# Patient Record
Sex: FEMALE | Race: WHITE | NOT HISPANIC OR LATINO | URBAN - METROPOLITAN AREA
[De-identification: names, ages, dates, MRNs, and addresses within clinical notes are randomized per-mention and may not be internally consistent; named-entity substitution may affect disease eponyms.]

---

## 2017-03-16 ENCOUNTER — INPATIENT (INPATIENT)
Facility: HOSPITAL | Age: 82
LOS: 2 days | Discharge: HOME | DRG: 311 | End: 2017-03-19
Attending: INTERNAL MEDICINE | Admitting: INTERNAL MEDICINE
Payer: MEDICARE

## 2017-03-16 VITALS
HEART RATE: 96 BPM | WEIGHT: 134.92 LBS | SYSTOLIC BLOOD PRESSURE: 165 MMHG | HEIGHT: 62 IN | RESPIRATION RATE: 17 BRPM | DIASTOLIC BLOOD PRESSURE: 78 MMHG | TEMPERATURE: 98 F

## 2017-03-16 DIAGNOSIS — I24.9 ACUTE ISCHEMIC HEART DISEASE, UNSPECIFIED: ICD-10-CM

## 2017-03-16 DIAGNOSIS — E03.9 HYPOTHYROIDISM, UNSPECIFIED: ICD-10-CM

## 2017-03-16 DIAGNOSIS — Z41.8 ENCOUNTER FOR OTHER PROCEDURES FOR PURPOSES OTHER THAN REMEDYING HEALTH STATE: ICD-10-CM

## 2017-03-16 DIAGNOSIS — M54.9 DORSALGIA, UNSPECIFIED: ICD-10-CM

## 2017-03-16 DIAGNOSIS — Z90.49 ACQUIRED ABSENCE OF OTHER SPECIFIED PARTS OF DIGESTIVE TRACT: Chronic | ICD-10-CM

## 2017-03-16 DIAGNOSIS — I10 ESSENTIAL (PRIMARY) HYPERTENSION: ICD-10-CM

## 2017-03-16 DIAGNOSIS — Z90.710 ACQUIRED ABSENCE OF BOTH CERVIX AND UTERUS: Chronic | ICD-10-CM

## 2017-03-16 LAB
ACETONE SERPL-MCNC: NEGATIVE — SIGNIFICANT CHANGE UP
ALBUMIN SERPL ELPH-MCNC: 3.5 G/DL — SIGNIFICANT CHANGE UP (ref 3.5–5)
ALP SERPL-CCNC: 83 U/L — SIGNIFICANT CHANGE UP (ref 40–120)
ALT FLD-CCNC: 14 U/L DA — SIGNIFICANT CHANGE UP (ref 10–60)
ANION GAP SERPL CALC-SCNC: 10 MMOL/L — SIGNIFICANT CHANGE UP (ref 5–17)
APTT BLD: 32.6 SEC — SIGNIFICANT CHANGE UP (ref 27.5–37.4)
AST SERPL-CCNC: 11 U/L — SIGNIFICANT CHANGE UP (ref 10–40)
BASOPHILS # BLD AUTO: 0.1 K/UL — SIGNIFICANT CHANGE UP (ref 0–0.2)
BASOPHILS NFR BLD AUTO: 0.9 % — SIGNIFICANT CHANGE UP (ref 0–2)
BILIRUB SERPL-MCNC: 0.2 MG/DL — SIGNIFICANT CHANGE UP (ref 0.2–1.2)
BUN SERPL-MCNC: 11 MG/DL — SIGNIFICANT CHANGE UP (ref 7–18)
CALCIUM SERPL-MCNC: 9.1 MG/DL — SIGNIFICANT CHANGE UP (ref 8.4–10.5)
CHLORIDE SERPL-SCNC: 107 MMOL/L — SIGNIFICANT CHANGE UP (ref 96–108)
CK MB BLD-MCNC: 1.9 % — SIGNIFICANT CHANGE UP (ref 0–3.5)
CK MB BLD-MCNC: 2.5 % — SIGNIFICANT CHANGE UP (ref 0–3.5)
CK MB CFR SERPL CALC: 1.1 NG/ML — SIGNIFICANT CHANGE UP (ref 0–3.6)
CK MB CFR SERPL CALC: 1.3 NG/ML — SIGNIFICANT CHANGE UP (ref 0–3.6)
CK SERPL-CCNC: 51 U/L — SIGNIFICANT CHANGE UP (ref 21–215)
CK SERPL-CCNC: 58 U/L — SIGNIFICANT CHANGE UP (ref 21–215)
CO2 SERPL-SCNC: 25 MMOL/L — SIGNIFICANT CHANGE UP (ref 22–31)
CREAT SERPL-MCNC: 0.86 MG/DL — SIGNIFICANT CHANGE UP (ref 0.5–1.3)
EOSINOPHIL # BLD AUTO: 0.1 K/UL — SIGNIFICANT CHANGE UP (ref 0–0.5)
EOSINOPHIL NFR BLD AUTO: 1.2 % — SIGNIFICANT CHANGE UP (ref 0–6)
GLUCOSE SERPL-MCNC: 103 MG/DL — HIGH (ref 70–99)
HCT VFR BLD CALC: 40.3 % — SIGNIFICANT CHANGE UP (ref 34.5–45)
HGB BLD-MCNC: 13.4 G/DL — SIGNIFICANT CHANGE UP (ref 11.5–15.5)
INR BLD: 0.91 RATIO — SIGNIFICANT CHANGE UP (ref 0.88–1.16)
LACTATE SERPL-SCNC: 1.5 MMOL/L — SIGNIFICANT CHANGE UP (ref 0.7–2)
LIDOCAIN IGE QN: 106 U/L — SIGNIFICANT CHANGE UP (ref 73–393)
LYMPHOCYTES # BLD AUTO: 2.1 K/UL — SIGNIFICANT CHANGE UP (ref 1–3.3)
LYMPHOCYTES # BLD AUTO: 26.9 % — SIGNIFICANT CHANGE UP (ref 13–44)
MAGNESIUM SERPL-MCNC: 2 MG/DL — SIGNIFICANT CHANGE UP (ref 1.8–2.4)
MCHC RBC-ENTMCNC: 29.2 PG — SIGNIFICANT CHANGE UP (ref 27–34)
MCHC RBC-ENTMCNC: 33.2 GM/DL — SIGNIFICANT CHANGE UP (ref 32–36)
MCV RBC AUTO: 88 FL — SIGNIFICANT CHANGE UP (ref 80–100)
MONOCYTES # BLD AUTO: 0.6 K/UL — SIGNIFICANT CHANGE UP (ref 0–0.9)
MONOCYTES NFR BLD AUTO: 7.3 % — SIGNIFICANT CHANGE UP (ref 2–14)
NEUTROPHILS # BLD AUTO: 4.9 K/UL — SIGNIFICANT CHANGE UP (ref 1.8–7.4)
NEUTROPHILS NFR BLD AUTO: 63.6 % — SIGNIFICANT CHANGE UP (ref 43–77)
NT-PROBNP SERPL-SCNC: 523 PG/ML — HIGH (ref 0–450)
PLATELET # BLD AUTO: 187 K/UL — SIGNIFICANT CHANGE UP (ref 150–400)
POTASSIUM SERPL-MCNC: 3.5 MMOL/L — SIGNIFICANT CHANGE UP (ref 3.5–5.3)
POTASSIUM SERPL-SCNC: 3.5 MMOL/L — SIGNIFICANT CHANGE UP (ref 3.5–5.3)
PROT SERPL-MCNC: 7.4 G/DL — SIGNIFICANT CHANGE UP (ref 6–8.3)
PROTHROM AB SERPL-ACNC: 10.2 SEC — SIGNIFICANT CHANGE UP (ref 10–13.1)
RBC # BLD: 4.58 M/UL — SIGNIFICANT CHANGE UP (ref 3.8–5.2)
RBC # FLD: 12.9 % — SIGNIFICANT CHANGE UP (ref 10.3–14.5)
SODIUM SERPL-SCNC: 142 MMOL/L — SIGNIFICANT CHANGE UP (ref 135–145)
T4 AB SER-ACNC: 10.7 UG/DL — SIGNIFICANT CHANGE UP (ref 4.6–12)
TROPONIN I SERPL-MCNC: <0.015 NG/ML — SIGNIFICANT CHANGE UP (ref 0–0.04)
TROPONIN I SERPL-MCNC: <0.015 NG/ML — SIGNIFICANT CHANGE UP (ref 0–0.04)
TSH SERPL-MCNC: 0.26 UU/ML — LOW (ref 0.34–4.82)
TSH SERPL-MCNC: 0.27 UU/ML — LOW (ref 0.34–4.82)
WBC # BLD: 7.6 K/UL — SIGNIFICANT CHANGE UP (ref 3.8–10.5)
WBC # FLD AUTO: 7.6 K/UL — SIGNIFICANT CHANGE UP (ref 3.8–10.5)

## 2017-03-16 PROCEDURE — 99285 EMERGENCY DEPT VISIT HI MDM: CPT

## 2017-03-16 PROCEDURE — 71010: CPT | Mod: 26

## 2017-03-16 RX ORDER — ASPIRIN/CALCIUM CARB/MAGNESIUM 324 MG
162 TABLET ORAL ONCE
Qty: 0 | Refills: 0 | Status: COMPLETED | OUTPATIENT
Start: 2017-03-16 | End: 2017-03-16

## 2017-03-16 RX ORDER — ATORVASTATIN CALCIUM 80 MG/1
20 TABLET, FILM COATED ORAL AT BEDTIME
Qty: 0 | Refills: 0 | Status: DISCONTINUED | OUTPATIENT
Start: 2017-03-16 | End: 2017-03-19

## 2017-03-16 RX ORDER — SODIUM CHLORIDE 9 MG/ML
3 INJECTION INTRAMUSCULAR; INTRAVENOUS; SUBCUTANEOUS ONCE
Qty: 0 | Refills: 0 | Status: COMPLETED | OUTPATIENT
Start: 2017-03-16 | End: 2017-03-16

## 2017-03-16 RX ORDER — ENOXAPARIN SODIUM 100 MG/ML
40 INJECTION SUBCUTANEOUS DAILY
Qty: 0 | Refills: 0 | Status: DISCONTINUED | OUTPATIENT
Start: 2017-03-16 | End: 2017-03-19

## 2017-03-16 RX ORDER — ASPIRIN/CALCIUM CARB/MAGNESIUM 324 MG
81 TABLET ORAL DAILY
Qty: 0 | Refills: 0 | Status: DISCONTINUED | OUTPATIENT
Start: 2017-03-16 | End: 2017-03-19

## 2017-03-16 RX ORDER — METOPROLOL TARTRATE 50 MG
50 TABLET ORAL DAILY
Qty: 0 | Refills: 0 | Status: DISCONTINUED | OUTPATIENT
Start: 2017-03-16 | End: 2017-03-19

## 2017-03-16 RX ORDER — AMLODIPINE BESYLATE 2.5 MG/1
5 TABLET ORAL DAILY
Qty: 0 | Refills: 0 | Status: DISCONTINUED | OUTPATIENT
Start: 2017-03-16 | End: 2017-03-19

## 2017-03-16 RX ORDER — LOSARTAN POTASSIUM 100 MG/1
50 TABLET, FILM COATED ORAL DAILY
Qty: 0 | Refills: 0 | Status: DISCONTINUED | OUTPATIENT
Start: 2017-03-16 | End: 2017-03-19

## 2017-03-16 RX ORDER — LEVOTHYROXINE SODIUM 125 MCG
88 TABLET ORAL DAILY
Qty: 0 | Refills: 0 | Status: DISCONTINUED | OUTPATIENT
Start: 2017-03-16 | End: 2017-03-16

## 2017-03-16 RX ADMIN — Medication 1 MILLIGRAM(S): at 23:21

## 2017-03-16 RX ADMIN — ATORVASTATIN CALCIUM 20 MILLIGRAM(S): 80 TABLET, FILM COATED ORAL at 23:19

## 2017-03-16 RX ADMIN — Medication 162 MILLIGRAM(S): at 17:07

## 2017-03-16 RX ADMIN — SODIUM CHLORIDE 3 MILLILITER(S): 9 INJECTION INTRAMUSCULAR; INTRAVENOUS; SUBCUTANEOUS at 17:07

## 2017-03-16 NOTE — ED ADULT NURSE NOTE - OBJECTIVE STATEMENT
AOX3 +ambulatory patient reports chest pain and sob x last night. Patient denies any numbness or tingling sensation denies any fevers or coughing

## 2017-03-16 NOTE — H&P ADULT. - PROBLEM SELECTOR PLAN 4
-IMPROVE VTE score 2, will start prophylaxis with Lovenox 40mg daily subcu. -Patient takes percocet 5/325mg q 12hrs for pain, will continue at the same dose.

## 2017-03-16 NOTE — H&P ADULT. - PROBLEM SELECTOR PLAN 1
-EKG NSR, troponin x1 negative, trend cardiac enzymes.  -Dr. Villarreal was consulted.  -Check TTE and monitor telemetry -EKG NSR, troponin x1 negative, trend cardiac enzymes.  -Dr. Villarreal was consulted.  -Check TTE and monitor telemetry. Stress test was not ordered at this time as per Dr. Villarreal. -EKG NSR, troponin x1 negative, trend cardiac enzymes.  -Dr. Villarreal was consulted.  -Check TTE and monitor telemetry. Stress test was not ordered at this time as per Dr. Villarreal.  -ACS protocol with metoprolol 50mg XL at home dose, aspirin, and lipitor 20mg(she takes simvastatin 20mg at home).

## 2017-03-16 NOTE — H&P ADULT. - ASSESSMENT
86 year-old female from home with private HHA 24/7, walks with cane and has PMH of Crohn's disease, hypothyroidism, HTN, HLD, Meningioma (not maligniant), herniated discs and PSHx of partial Hysterectomy and appendectomy presented to ED with c/o feeling of not being well, one episode of left sided stabbing chest pain, 3/10 in intensity, radiating to left  arm and back, worsens with deep breathing, which lasted for few seconds. She also had whole body shakiness with sweating however did not take temperature at home. Has had profuse sweating noted on the bed and decreased appetite for past few days. As per patient, when EMS arrived at home, her SBP was in 200's. In ED, BP was 167/58 other vitals were stable. Patient was symptom free when seen by me in ED. Was admitted to telemetry floor for concern for ACS. EKG NSR at 98BPM with left axis deviation. 86 year-old female from home with private HHA 24/7, walks with cane and has PMH of Crohn's disease, hypothyroidism, HTN, HLD, Meningioma (not maligniant), herniated discs and PSH of partial Hysterectomy and appendectomy presented to ED with c/o feeling of not being well, one episode of left sided stabbing chest pain happened this morning, 3/10 in intensity, radiating to left  arm and back, worsens with deep breathing, which lasted for few seconds. She also had whole body shakiness with sweating however did not take temperature at home. Has had profuse sweating noted on the bed and decreased appetite for past few days. She has been followed by outpatient cardiologist Dr. Whitfield, however denied recent cardiac work up such as echocardiogram, stress test, and cardiac cath. As per patient, when EMS arrived at home, her SBP was in 200's. In ED, BP was 167/58 other vitals were stable. Patient was symptom free when seen by me in ED. Was admitted to telemetry floor for concern for ACS. EKG NSR at 98BPM with left axis deviation.

## 2017-03-16 NOTE — H&P ADULT. - PROBLEM SELECTOR PLAN 2
-Patient states that she had SBP of 200's at home, symptoms can be from hypertensive emergency.  -Will continue home meds at the same dose except hydralazine and felodipine, since patient does not know hydralazine dose now and felodipine is not available in our pharmacy. Will switch it to Norvasc and monitor BP.   -Primary team follow up with outpatient cardiologist regarding correct medication dose. -Patient states that she had SBP of 200's at home, symptoms can be from hypertensive emergency.  -Will continue home meds at the same dose except hydralazine and felodipine, since patient does not know hydralazine dose now and felodipine is not available in our pharmacy. Will switch it to Norvasc and monitor BP.   -Primary team follow up with outpatient cardiologist/pharmacy regarding correct medication dose, since patient gives inconsistent history and is somewhat confused about medication dose. I reconciled meds based on the list that she is carrying.

## 2017-03-16 NOTE — ED PROVIDER NOTE - MEDICAL DECISION MAKING DETAILS
Pt with rigors, diaphoresis and pale. Concern for ACS vs infectious process, will get labs, EKG then reassess.

## 2017-03-16 NOTE — ED PROVIDER NOTE - OBJECTIVE STATEMENT
87 y/o female BIBA with PMHx of Chrons Disease, HTN, HLD, Meningioma (not maligniant), herniated discs and PSHx of Partial Hysterectomy and Appendectomy presents to the ED c/o rigors, diaphoretic and pale this morning. Pt reports that she has been noticing that for the last 2 weeks she has been having increased sweatiness and pain to L breast and also and L arm pain. Pt also with decreased appetite since yesterday. Pt denies fever, coughing, pain with urination, or any other complaints. NKDA. PMD: Dr. Shane. Pt is a former smoker for 40 years, stopped 20 years ago. 87 y/o female BIBA with PMHx of Crohn's Disease, HTN, HLD, Meningioma (not maligniant), herniated discs and PSHx of Partial Hysterectomy and Appendectomy presents to the ED c/o rigors, diaphoretic and pale this morning. Pt reports that she has been noticing that for the last 2 weeks she has been having increased sweatiness and pain to L breast and also and L arm pain. Pt also with decreased appetite since yesterday. Pt denies fever, coughing, pain with urination, or any other complaints. NKDA. PMD: Dr. Shane. Pt is a former smoker for 40 years, stopped 20 years ago.

## 2017-03-16 NOTE — ED PROVIDER NOTE - NS ED MD SCRIBE ATTENDING SCRIBE SECTIONS
REVIEW OF SYSTEMS/PHYSICAL EXAM/VITAL SIGNS( Pullset)/HISTORY OF PRESENT ILLNESS/HIV/DISPOSITION/PAST MEDICAL/SURGICAL/SOCIAL HISTORY

## 2017-03-16 NOTE — H&P ADULT. - PROBLEM SELECTOR PLAN 3
-Patient has h/o hypothyroidism and takes 88mcg of synthroid, on admission TSH was 0.27 with normal free T4.  -Will check free and total T3 and recheck TSH, hold synthroid for now since patient's symptoms raise concern for hyperthyroidism(tremor, sweating, and anxiety). Resume as appropriate. -Patient has h/o hypothyroidism and takes 88mcg of synthroid, on admission TSH was 0.27 with normal free T4.  -Will check free and total T3 and recheck TSH, hold synthroid for now since patient's symptoms raise concern for hyperthyroidism(tremor, sweating, and anxiety). Resume synthroid as appropriate.

## 2017-03-17 LAB
ANION GAP SERPL CALC-SCNC: 10 MMOL/L — SIGNIFICANT CHANGE UP (ref 5–17)
BUN SERPL-MCNC: 14 MG/DL — SIGNIFICANT CHANGE UP (ref 7–18)
CALCIUM SERPL-MCNC: 9.4 MG/DL — SIGNIFICANT CHANGE UP (ref 8.4–10.5)
CHLORIDE SERPL-SCNC: 108 MMOL/L — SIGNIFICANT CHANGE UP (ref 96–108)
CK MB BLD-MCNC: 3 % — SIGNIFICANT CHANGE UP (ref 0–3.5)
CK MB CFR SERPL CALC: 1.4 NG/ML — SIGNIFICANT CHANGE UP (ref 0–3.6)
CK SERPL-CCNC: 47 U/L — SIGNIFICANT CHANGE UP (ref 21–215)
CO2 SERPL-SCNC: 26 MMOL/L — SIGNIFICANT CHANGE UP (ref 22–31)
CREAT SERPL-MCNC: 0.9 MG/DL — SIGNIFICANT CHANGE UP (ref 0.5–1.3)
GLUCOSE SERPL-MCNC: 95 MG/DL — SIGNIFICANT CHANGE UP (ref 70–99)
HCT VFR BLD CALC: 38.4 % — SIGNIFICANT CHANGE UP (ref 34.5–45)
HGB BLD-MCNC: 12.9 G/DL — SIGNIFICANT CHANGE UP (ref 11.5–15.5)
MAGNESIUM SERPL-MCNC: 2.2 MG/DL — SIGNIFICANT CHANGE UP (ref 1.8–2.4)
MCHC RBC-ENTMCNC: 29 PG — SIGNIFICANT CHANGE UP (ref 27–34)
MCHC RBC-ENTMCNC: 33.5 GM/DL — SIGNIFICANT CHANGE UP (ref 32–36)
MCV RBC AUTO: 86.6 FL — SIGNIFICANT CHANGE UP (ref 80–100)
PHOSPHATE SERPL-MCNC: 4.2 MG/DL — SIGNIFICANT CHANGE UP (ref 2.5–4.5)
PLATELET # BLD AUTO: 181 K/UL — SIGNIFICANT CHANGE UP (ref 150–400)
POTASSIUM SERPL-MCNC: 3.6 MMOL/L — SIGNIFICANT CHANGE UP (ref 3.5–5.3)
POTASSIUM SERPL-SCNC: 3.6 MMOL/L — SIGNIFICANT CHANGE UP (ref 3.5–5.3)
RBC # BLD: 4.43 M/UL — SIGNIFICANT CHANGE UP (ref 3.8–5.2)
RBC # FLD: 13.1 % — SIGNIFICANT CHANGE UP (ref 10.3–14.5)
SODIUM SERPL-SCNC: 144 MMOL/L — SIGNIFICANT CHANGE UP (ref 135–145)
T3 SERPL-MCNC: 105 NG/DL — SIGNIFICANT CHANGE UP (ref 80–200)
T3FREE SERPL-MCNC: 2.77 PG/ML — SIGNIFICANT CHANGE UP (ref 1.8–4.6)
TROPONIN I SERPL-MCNC: <0.015 NG/ML — SIGNIFICANT CHANGE UP (ref 0–0.04)
WBC # BLD: 6.5 K/UL — SIGNIFICANT CHANGE UP (ref 3.8–10.5)
WBC # FLD AUTO: 6.5 K/UL — SIGNIFICANT CHANGE UP (ref 3.8–10.5)

## 2017-03-17 RX ORDER — POLYETHYLENE GLYCOL 3350 17 G/17G
17 POWDER, FOR SOLUTION ORAL ONCE
Qty: 0 | Refills: 0 | Status: COMPLETED | OUTPATIENT
Start: 2017-03-17 | End: 2017-03-17

## 2017-03-17 RX ORDER — LEVOTHYROXINE SODIUM 125 MCG
1 TABLET ORAL
Qty: 0 | Refills: 0 | COMMUNITY

## 2017-03-17 RX ORDER — LEVOTHYROXINE SODIUM 125 MCG
1 TABLET ORAL
Qty: 0 | Refills: 0 | DISCHARGE
Start: 2017-03-17

## 2017-03-17 RX ORDER — AMLODIPINE BESYLATE 2.5 MG/1
5 TABLET ORAL ONCE
Qty: 0 | Refills: 0 | Status: COMPLETED | OUTPATIENT
Start: 2017-03-17 | End: 2017-03-17

## 2017-03-17 RX ORDER — LEVOTHYROXINE SODIUM 125 MCG
75 TABLET ORAL DAILY
Qty: 0 | Refills: 0 | Status: DISCONTINUED | OUTPATIENT
Start: 2017-03-17 | End: 2017-03-19

## 2017-03-17 RX ORDER — LANOLIN ALCOHOL/MO/W.PET/CERES
3 CREAM (GRAM) TOPICAL AT BEDTIME
Qty: 0 | Refills: 0 | Status: DISCONTINUED | OUTPATIENT
Start: 2017-03-17 | End: 2017-03-19

## 2017-03-17 RX ADMIN — Medication 1 MILLIGRAM(S): at 20:12

## 2017-03-17 RX ADMIN — AMLODIPINE BESYLATE 5 MILLIGRAM(S): 2.5 TABLET ORAL at 06:27

## 2017-03-17 RX ADMIN — Medication 50 MILLIGRAM(S): at 06:27

## 2017-03-17 RX ADMIN — Medication 3 MILLIGRAM(S): at 01:17

## 2017-03-17 RX ADMIN — Medication 81 MILLIGRAM(S): at 12:32

## 2017-03-17 RX ADMIN — AMLODIPINE BESYLATE 5 MILLIGRAM(S): 2.5 TABLET ORAL at 17:27

## 2017-03-17 RX ADMIN — LOSARTAN POTASSIUM 50 MILLIGRAM(S): 100 TABLET, FILM COATED ORAL at 06:27

## 2017-03-17 RX ADMIN — POLYETHYLENE GLYCOL 3350 17 GRAM(S): 17 POWDER, FOR SOLUTION ORAL at 14:50

## 2017-03-17 RX ADMIN — Medication 3 MILLIGRAM(S): at 23:38

## 2017-03-17 RX ADMIN — ATORVASTATIN CALCIUM 20 MILLIGRAM(S): 80 TABLET, FILM COATED ORAL at 21:49

## 2017-03-17 NOTE — DISCHARGE NOTE ADULT - PATIENT PORTAL LINK FT
“You can access the [facility] Patient Portal, offered by [facility], by registering with the following website: [email or web address]”

## 2017-03-17 NOTE — DISCHARGE NOTE ADULT - CARE PLAN
Principal Discharge DX:	ACS (acute coronary syndrome)  Goal:	ACS ruled out  Instructions for follow-up, activity and diet:	Follow with primary care doctor within a week of discharge. Seek urgent medical attention if you have severe chest pain and follow up with PCP within a week of discharge.  Secondary Diagnosis:	Essential hypertension  Instructions for follow-up, activity and diet:	Continue medical management as prescribed.  Secondary Diagnosis:	Hypothyroidism  Instructions for follow-up, activity and diet:	Continue levothyroxine 75 mcg.  Secondary Diagnosis:	Back pain  Instructions for follow-up, activity and diet:	Continue pain medication as pre PCP. Principal Discharge DX:	ACS (acute coronary syndrome)  Goal:	ACS ruled out  Instructions for follow-up, activity and diet:	Follow with primary care doctor within a week of discharge. Seek urgent medical attention if you have severe chest pain and follow up with PCP within a week of discharge.  Secondary Diagnosis:	Essential hypertension  Instructions for follow-up, activity and diet:	Continue medical management as prescribed. Goal BP <150/90  Secondary Diagnosis:	Hypothyroidism  Instructions for follow-up, activity and diet:	Continue levothyroxine 75 mcg.  Secondary Diagnosis:	Back pain  Instructions for follow-up, activity and diet:	Continue pain medication as pre PCP.

## 2017-03-17 NOTE — DISCHARGE NOTE ADULT - PLAN OF CARE
ACS ruled out Follow with primary care doctor within a week of discharge. Seek urgent medical attention if you have severe chest pain and follow up with PCP within a week of discharge. Continue medical management as prescribed. Continue levothyroxine 75 mcg. Continue pain medication as pre PCP. Continue medical management as prescribed. Goal BP <150/90

## 2017-03-17 NOTE — DISCHARGE NOTE ADULT - HOSPITAL COURSE
86 year-old female from home with private HHA 24/7, walks with cane and has PMH of Crohn's disease, hypothyroidism, HTN, HLD, Meningioma (not maligniant), herniated discs and PSH of partial Hysterectomy and appendectomy presented to ED with c/o feeling of not being well, one episode of left sided stabbing chest pain happened this morning, 3/10 in intensity, radiating to left  arm and back, worsens with deep breathing, which lasted for few seconds. She also had whole body shakiness with sweating however did not take temperature at home. Has had profuse sweating noted on the bed and decreased appetite for past few days. She has been followed by outpatient cardiologist Dr. Whitfield, however denied recent cardiac work up such as echocardiogram, stress test, and cardiac cath. As per patient, when EMS arrived at home, her SBP was in 200's. In ED, BP was 167/58 other vitals were stable. Patient was symptom free when seen by me in ED. Was admitted to telemetry floor for concern for ACS. EKG NSR at 98BPM with left axis deviation.     Patient was admitted for ACS (acute coronary syndrome). EKG NSR, troponin x1 negative, cardiac enzymes remained -ve. Dr. Villarreal was consulted. Echo showed Normal Left Ventricular Systolic Function,  (EF = 55 to 60%Check TTE and monitor telemetry. Stress test was not ordered at this time as per Dr. Villarreal. Patient was treated with metoprolol 50mg XL at home dose, aspirin, and lipitor 20mg(she takes simvastatin 20mg at home).        Essential hypertension.    -Patient states that she had SBP of 200's at home, symptoms can be from hypertensive emergency. Will continue home meds at the same dose except hydralazine and felodipine, since patient does not know hydralazine dose now and felodipine is not available in our pharmacy. We switched it to Norvasc and monitor BP.     Hypothyroidism. Patient has h/o hypothyroidism and takes 88mcg of synthroid, on admission TSH was 0.27 with normal free T4. Since patient's symptoms raise concern for hyperthyroidism (tremor, sweating, and anxiety). levothyroxine dose decreased to 75 daily      Back pain. Patient takes percocet 5/325mg q 12hrs for pain, we continued at the same dose.    Patient is stable to be discharged home as per attendings.

## 2017-03-17 NOTE — DISCHARGE NOTE ADULT - MEDICATION SUMMARY - MEDICATIONS TO TAKE
I will START or STAY ON the medications listed below when I get home from the hospital:    aspirin  --  by mouth   -- Indication: For ASCVD prevention    Percocet 5/325  --  by mouth   -- Indication: For Pain as needed    Percocet 5/325 oral tablet  -- 1 tab(s) by mouth every 12 hours, As Needed  -- Indication: For Pain as needed    Ativan 1 mg oral tablet  -- 1 milligram(s) by mouth once a day (at bedtime), As Needed  -- Indication: For Anxiety    simvastatin 20 mg oral tablet  -- 1 tab(s) by mouth once a day (at bedtime)  -- Indication: For HLD    Hyzaar 50 mg-12.5 mg oral tablet  -- 1 tab(s) by mouth once a day  -- Indication: For Htn    Toprol-XL 50 mg oral tablet, extended release  -- 1 tab(s) by mouth once a day  -- Indication: For Htn    Plendil  -- 5 milligram(s) by mouth 2 times a day  -- Indication: For Htn    levothyroxine 75 mcg (0.075 mg) oral tablet  -- 1 tab(s) by mouth once a day  -- Indication: For Hypothyroidism    hydrALAZINE  --  by mouth once a day  -- Indication: For Htn

## 2017-03-18 RX ADMIN — ENOXAPARIN SODIUM 40 MILLIGRAM(S): 100 INJECTION SUBCUTANEOUS at 11:31

## 2017-03-18 RX ADMIN — Medication 1 MILLIGRAM(S): at 20:32

## 2017-03-18 RX ADMIN — Medication 75 MICROGRAM(S): at 06:44

## 2017-03-18 RX ADMIN — Medication 81 MILLIGRAM(S): at 11:31

## 2017-03-18 RX ADMIN — LOSARTAN POTASSIUM 50 MILLIGRAM(S): 100 TABLET, FILM COATED ORAL at 06:45

## 2017-03-18 RX ADMIN — AMLODIPINE BESYLATE 5 MILLIGRAM(S): 2.5 TABLET ORAL at 06:44

## 2017-03-18 RX ADMIN — ATORVASTATIN CALCIUM 20 MILLIGRAM(S): 80 TABLET, FILM COATED ORAL at 21:41

## 2017-03-18 RX ADMIN — Medication 50 MILLIGRAM(S): at 06:44

## 2017-03-19 VITALS
OXYGEN SATURATION: 95 % | DIASTOLIC BLOOD PRESSURE: 28 MMHG | HEART RATE: 52 BPM | RESPIRATION RATE: 16 BRPM | TEMPERATURE: 98 F | SYSTOLIC BLOOD PRESSURE: 123 MMHG

## 2017-03-19 PROCEDURE — 87040 BLOOD CULTURE FOR BACTERIA: CPT

## 2017-03-19 PROCEDURE — 84443 ASSAY THYROID STIM HORMONE: CPT

## 2017-03-19 PROCEDURE — 83880 ASSAY OF NATRIURETIC PEPTIDE: CPT

## 2017-03-19 PROCEDURE — 80048 BASIC METABOLIC PNL TOTAL CA: CPT

## 2017-03-19 PROCEDURE — 93005 ELECTROCARDIOGRAM TRACING: CPT

## 2017-03-19 PROCEDURE — 83690 ASSAY OF LIPASE: CPT

## 2017-03-19 PROCEDURE — 82009 KETONE BODYS QUAL: CPT

## 2017-03-19 PROCEDURE — 85730 THROMBOPLASTIN TIME PARTIAL: CPT

## 2017-03-19 PROCEDURE — 83605 ASSAY OF LACTIC ACID: CPT

## 2017-03-19 PROCEDURE — 80053 COMPREHEN METABOLIC PANEL: CPT

## 2017-03-19 PROCEDURE — 82553 CREATINE MB FRACTION: CPT

## 2017-03-19 PROCEDURE — 84480 ASSAY TRIIODOTHYRONINE (T3): CPT

## 2017-03-19 PROCEDURE — 84481 FREE ASSAY (FT-3): CPT

## 2017-03-19 PROCEDURE — 85027 COMPLETE CBC AUTOMATED: CPT

## 2017-03-19 PROCEDURE — 82550 ASSAY OF CK (CPK): CPT

## 2017-03-19 PROCEDURE — 93306 TTE W/DOPPLER COMPLETE: CPT

## 2017-03-19 PROCEDURE — 84484 ASSAY OF TROPONIN QUANT: CPT

## 2017-03-19 PROCEDURE — 84100 ASSAY OF PHOSPHORUS: CPT

## 2017-03-19 PROCEDURE — 71045 X-RAY EXAM CHEST 1 VIEW: CPT

## 2017-03-19 PROCEDURE — 99285 EMERGENCY DEPT VISIT HI MDM: CPT | Mod: 25

## 2017-03-19 PROCEDURE — 84436 ASSAY OF TOTAL THYROXINE: CPT

## 2017-03-19 PROCEDURE — 85610 PROTHROMBIN TIME: CPT

## 2017-03-19 PROCEDURE — 83735 ASSAY OF MAGNESIUM: CPT

## 2017-03-19 RX ADMIN — Medication 50 MILLIGRAM(S): at 05:47

## 2017-03-19 RX ADMIN — LOSARTAN POTASSIUM 50 MILLIGRAM(S): 100 TABLET, FILM COATED ORAL at 05:47

## 2017-03-19 RX ADMIN — Medication 81 MILLIGRAM(S): at 12:43

## 2017-03-19 RX ADMIN — AMLODIPINE BESYLATE 5 MILLIGRAM(S): 2.5 TABLET ORAL at 05:46

## 2017-03-19 RX ADMIN — Medication 3 MILLIGRAM(S): at 01:16

## 2017-03-19 RX ADMIN — Medication 75 MICROGRAM(S): at 05:46

## 2017-03-22 DIAGNOSIS — Z87.891 PERSONAL HISTORY OF NICOTINE DEPENDENCE: ICD-10-CM

## 2017-03-22 DIAGNOSIS — K50.90 CROHN'S DISEASE, UNSPECIFIED, WITHOUT COMPLICATIONS: ICD-10-CM

## 2017-03-22 DIAGNOSIS — I10 ESSENTIAL (PRIMARY) HYPERTENSION: ICD-10-CM

## 2017-03-22 DIAGNOSIS — E03.9 HYPOTHYROIDISM, UNSPECIFIED: ICD-10-CM

## 2017-03-22 DIAGNOSIS — I24.9 ACUTE ISCHEMIC HEART DISEASE, UNSPECIFIED: ICD-10-CM

## 2017-03-22 DIAGNOSIS — E78.5 HYPERLIPIDEMIA, UNSPECIFIED: ICD-10-CM

## 2017-03-22 DIAGNOSIS — M54.9 DORSALGIA, UNSPECIFIED: ICD-10-CM

## 2017-03-22 DIAGNOSIS — D32.0 BENIGN NEOPLASM OF CEREBRAL MENINGES: ICD-10-CM

## 2017-03-22 LAB
CULTURE RESULTS: SIGNIFICANT CHANGE UP
CULTURE RESULTS: SIGNIFICANT CHANGE UP
SPECIMEN SOURCE: SIGNIFICANT CHANGE UP
SPECIMEN SOURCE: SIGNIFICANT CHANGE UP

## 2023-06-19 ENCOUNTER — LABORATORY RESULT (OUTPATIENT)
Age: 88
End: 2023-06-19

## 2023-06-19 ENCOUNTER — APPOINTMENT (OUTPATIENT)
Dept: NEPHROLOGY | Facility: CLINIC | Age: 88
End: 2023-06-19
Payer: MEDICARE

## 2023-06-19 VITALS — WEIGHT: 121.47 LBS

## 2023-06-19 DIAGNOSIS — R19.8 OTHER SPECIFIED SYMPTOMS AND SIGNS INVOLVING THE DIGESTIVE SYSTEM AND ABDOMEN: ICD-10-CM

## 2023-06-19 PROCEDURE — 99205 OFFICE O/P NEW HI 60 MIN: CPT | Mod: 25

## 2023-06-19 PROCEDURE — 93000 ELECTROCARDIOGRAM COMPLETE: CPT

## 2023-06-19 PROCEDURE — 36415 COLL VENOUS BLD VENIPUNCTURE: CPT

## 2023-06-27 ENCOUNTER — INPATIENT (INPATIENT)
Facility: HOSPITAL | Age: 88
LOS: 1 days | Discharge: HOME HEALTH | DRG: 543 | End: 2023-06-29
Attending: STUDENT IN AN ORGANIZED HEALTH CARE EDUCATION/TRAINING PROGRAM | Admitting: STUDENT IN AN ORGANIZED HEALTH CARE EDUCATION/TRAINING PROGRAM
Payer: MEDICARE

## 2023-06-27 VITALS
OXYGEN SATURATION: 95 % | RESPIRATION RATE: 18 BRPM | DIASTOLIC BLOOD PRESSURE: 67 MMHG | TEMPERATURE: 98 F | HEART RATE: 74 BPM | WEIGHT: 119.93 LBS | SYSTOLIC BLOOD PRESSURE: 154 MMHG | HEIGHT: 61 IN

## 2023-06-27 DIAGNOSIS — Z87.898 PERSONAL HISTORY OF OTHER SPECIFIED CONDITIONS: ICD-10-CM

## 2023-06-27 DIAGNOSIS — D32.9 BENIGN NEOPLASM OF MENINGES, UNSPECIFIED: ICD-10-CM

## 2023-06-27 DIAGNOSIS — Z90.49 ACQUIRED ABSENCE OF OTHER SPECIFIED PARTS OF DIGESTIVE TRACT: Chronic | ICD-10-CM

## 2023-06-27 DIAGNOSIS — I10 ESSENTIAL (PRIMARY) HYPERTENSION: ICD-10-CM

## 2023-06-27 DIAGNOSIS — M54.9 DORSALGIA, UNSPECIFIED: ICD-10-CM

## 2023-06-27 DIAGNOSIS — Z29.9 ENCOUNTER FOR PROPHYLACTIC MEASURES, UNSPECIFIED: ICD-10-CM

## 2023-06-27 DIAGNOSIS — Z90.710 ACQUIRED ABSENCE OF BOTH CERVIX AND UTERUS: Chronic | ICD-10-CM

## 2023-06-27 DIAGNOSIS — D64.9 ANEMIA, UNSPECIFIED: ICD-10-CM

## 2023-06-27 DIAGNOSIS — E78.5 HYPERLIPIDEMIA, UNSPECIFIED: ICD-10-CM

## 2023-06-27 LAB
ANION GAP SERPL CALC-SCNC: 10 MMOL/L — SIGNIFICANT CHANGE UP (ref 5–17)
APPEARANCE UR: CLEAR — SIGNIFICANT CHANGE UP
BACTERIA # UR AUTO: PRESENT /HPF
BASOPHILS # BLD AUTO: 0.02 K/UL — SIGNIFICANT CHANGE UP (ref 0–0.2)
BASOPHILS NFR BLD AUTO: 0.5 % — SIGNIFICANT CHANGE UP (ref 0–2)
BILIRUB UR-MCNC: NEGATIVE — SIGNIFICANT CHANGE UP
BUN SERPL-MCNC: 25 MG/DL — HIGH (ref 7–23)
CALCIUM SERPL-MCNC: 9 MG/DL — SIGNIFICANT CHANGE UP (ref 8.4–10.5)
CHLORIDE SERPL-SCNC: 112 MMOL/L — HIGH (ref 96–108)
CO2 SERPL-SCNC: 22 MMOL/L — SIGNIFICANT CHANGE UP (ref 22–31)
COLOR SPEC: YELLOW — SIGNIFICANT CHANGE UP
CREAT SERPL-MCNC: 1.06 MG/DL — SIGNIFICANT CHANGE UP (ref 0.5–1.3)
DIFF PNL FLD: NEGATIVE — SIGNIFICANT CHANGE UP
EGFR: 49 ML/MIN/1.73M2 — LOW
EOSINOPHIL # BLD AUTO: 0.15 K/UL — SIGNIFICANT CHANGE UP (ref 0–0.5)
EOSINOPHIL NFR BLD AUTO: 3.4 % — SIGNIFICANT CHANGE UP (ref 0–6)
EPI CELLS # UR: SIGNIFICANT CHANGE UP /HPF (ref 0–5)
GLUCOSE SERPL-MCNC: 100 MG/DL — HIGH (ref 70–99)
GLUCOSE UR QL: NEGATIVE — SIGNIFICANT CHANGE UP
HCT VFR BLD CALC: 31.3 % — LOW (ref 34.5–45)
HGB BLD-MCNC: 9.7 G/DL — LOW (ref 11.5–15.5)
IMM GRANULOCYTES NFR BLD AUTO: 0.2 % — SIGNIFICANT CHANGE UP (ref 0–0.9)
KETONES UR-MCNC: NEGATIVE — SIGNIFICANT CHANGE UP
LEUKOCYTE ESTERASE UR-ACNC: ABNORMAL
LIDOCAIN IGE QN: 19 U/L — SIGNIFICANT CHANGE UP (ref 7–60)
LYMPHOCYTES # BLD AUTO: 1.72 K/UL — SIGNIFICANT CHANGE UP (ref 1–3.3)
LYMPHOCYTES # BLD AUTO: 38.7 % — SIGNIFICANT CHANGE UP (ref 13–44)
MCHC RBC-ENTMCNC: 27.3 PG — SIGNIFICANT CHANGE UP (ref 27–34)
MCHC RBC-ENTMCNC: 31 GM/DL — LOW (ref 32–36)
MCV RBC AUTO: 88.2 FL — SIGNIFICANT CHANGE UP (ref 80–100)
MONOCYTES # BLD AUTO: 0.43 K/UL — SIGNIFICANT CHANGE UP (ref 0–0.9)
MONOCYTES NFR BLD AUTO: 9.7 % — SIGNIFICANT CHANGE UP (ref 2–14)
NEUTROPHILS # BLD AUTO: 2.11 K/UL — SIGNIFICANT CHANGE UP (ref 1.8–7.4)
NEUTROPHILS NFR BLD AUTO: 47.5 % — SIGNIFICANT CHANGE UP (ref 43–77)
NITRITE UR-MCNC: NEGATIVE — SIGNIFICANT CHANGE UP
NRBC # BLD: 0 /100 WBCS — SIGNIFICANT CHANGE UP (ref 0–0)
PH UR: 5.5 — SIGNIFICANT CHANGE UP (ref 5–8)
PLATELET # BLD AUTO: 144 K/UL — LOW (ref 150–400)
POTASSIUM SERPL-MCNC: 4.4 MMOL/L — SIGNIFICANT CHANGE UP (ref 3.5–5.3)
POTASSIUM SERPL-SCNC: 4.4 MMOL/L — SIGNIFICANT CHANGE UP (ref 3.5–5.3)
PROT UR-MCNC: NEGATIVE MG/DL — SIGNIFICANT CHANGE UP
RBC # BLD: 3.55 M/UL — LOW (ref 3.8–5.2)
RBC # FLD: 15.7 % — HIGH (ref 10.3–14.5)
RBC CASTS # UR COMP ASSIST: < 5 /HPF — SIGNIFICANT CHANGE UP
SODIUM SERPL-SCNC: 144 MMOL/L — SIGNIFICANT CHANGE UP (ref 135–145)
SP GR SPEC: 1.02 — SIGNIFICANT CHANGE UP (ref 1–1.03)
UROBILINOGEN FLD QL: 0.2 E.U./DL — SIGNIFICANT CHANGE UP
WBC # BLD: 4.44 K/UL — SIGNIFICANT CHANGE UP (ref 3.8–10.5)
WBC # FLD AUTO: 4.44 K/UL — SIGNIFICANT CHANGE UP (ref 3.8–10.5)
WBC UR QL: < 5 /HPF — SIGNIFICANT CHANGE UP

## 2023-06-27 PROCEDURE — G1004: CPT

## 2023-06-27 PROCEDURE — 99223 1ST HOSP IP/OBS HIGH 75: CPT | Mod: GC

## 2023-06-27 PROCEDURE — 74177 CT ABD & PELVIS W/CONTRAST: CPT | Mod: 26,MG

## 2023-06-27 PROCEDURE — 99285 EMERGENCY DEPT VISIT HI MDM: CPT | Mod: FS

## 2023-06-27 RX ORDER — SIMVASTATIN 20 MG/1
40 TABLET, FILM COATED ORAL AT BEDTIME
Refills: 0 | Status: DISCONTINUED | OUTPATIENT
Start: 2023-06-27 | End: 2023-06-29

## 2023-06-27 RX ORDER — SIMVASTATIN 20 MG/1
1 TABLET, FILM COATED ORAL
Qty: 0 | Refills: 0 | DISCHARGE

## 2023-06-27 RX ORDER — OXYCODONE AND ACETAMINOPHEN 5; 325 MG/1; MG/1
1 TABLET ORAL
Qty: 0 | Refills: 0 | DISCHARGE

## 2023-06-27 RX ORDER — IOHEXOL 300 MG/ML
30 INJECTION, SOLUTION INTRAVENOUS ONCE
Refills: 0 | Status: COMPLETED | OUTPATIENT
Start: 2023-06-27 | End: 2023-06-27

## 2023-06-27 RX ORDER — METOCLOPRAMIDE HCL 10 MG
10 TABLET ORAL ONCE
Refills: 0 | Status: COMPLETED | OUTPATIENT
Start: 2023-06-27 | End: 2023-06-27

## 2023-06-27 RX ORDER — LANOLIN ALCOHOL/MO/W.PET/CERES
3 CREAM (GRAM) TOPICAL AT BEDTIME
Refills: 0 | Status: DISCONTINUED | OUTPATIENT
Start: 2023-06-27 | End: 2023-06-29

## 2023-06-27 RX ORDER — ACETAMINOPHEN 500 MG
650 TABLET ORAL EVERY 6 HOURS
Refills: 0 | Status: DISCONTINUED | OUTPATIENT
Start: 2023-06-27 | End: 2023-06-27

## 2023-06-27 RX ORDER — AMLODIPINE BESYLATE 2.5 MG/1
2.5 TABLET ORAL EVERY 24 HOURS
Refills: 0 | Status: DISCONTINUED | OUTPATIENT
Start: 2023-06-27 | End: 2023-06-29

## 2023-06-27 RX ORDER — LOSARTAN/HYDROCHLOROTHIAZIDE 100MG-25MG
1 TABLET ORAL
Qty: 0 | Refills: 0 | DISCHARGE

## 2023-06-27 RX ORDER — ENOXAPARIN SODIUM 100 MG/ML
30 INJECTION SUBCUTANEOUS EVERY 24 HOURS
Refills: 0 | Status: DISCONTINUED | OUTPATIENT
Start: 2023-06-28 | End: 2023-06-28

## 2023-06-27 RX ORDER — PANTOPRAZOLE SODIUM 20 MG/1
40 TABLET, DELAYED RELEASE ORAL
Refills: 0 | Status: DISCONTINUED | OUTPATIENT
Start: 2023-06-27 | End: 2023-06-29

## 2023-06-27 RX ORDER — ONDANSETRON 8 MG/1
4 TABLET, FILM COATED ORAL ONCE
Refills: 0 | Status: COMPLETED | OUTPATIENT
Start: 2023-06-27 | End: 2023-06-27

## 2023-06-27 RX ORDER — METOPROLOL TARTRATE 50 MG
50 TABLET ORAL EVERY 24 HOURS
Refills: 0 | Status: DISCONTINUED | OUTPATIENT
Start: 2023-06-28 | End: 2023-06-29

## 2023-06-27 RX ORDER — SODIUM CHLORIDE 9 MG/ML
500 INJECTION INTRAMUSCULAR; INTRAVENOUS; SUBCUTANEOUS ONCE
Refills: 0 | Status: COMPLETED | OUTPATIENT
Start: 2023-06-27 | End: 2023-06-27

## 2023-06-27 RX ORDER — ONDANSETRON 8 MG/1
4 TABLET, FILM COATED ORAL EVERY 8 HOURS
Refills: 0 | Status: DISCONTINUED | OUTPATIENT
Start: 2023-06-27 | End: 2023-06-29

## 2023-06-27 RX ORDER — POLYETHYLENE GLYCOL 3350 17 G/17G
17 POWDER, FOR SOLUTION ORAL
Refills: 0 | Status: DISCONTINUED | OUTPATIENT
Start: 2023-06-27 | End: 2023-06-27

## 2023-06-27 RX ORDER — HYDRALAZINE HCL 50 MG
10 TABLET ORAL THREE TIMES A DAY
Refills: 0 | Status: DISCONTINUED | OUTPATIENT
Start: 2023-06-27 | End: 2023-06-29

## 2023-06-27 RX ORDER — HYDRALAZINE HCL 50 MG
0 TABLET ORAL
Qty: 0 | Refills: 0 | DISCHARGE

## 2023-06-27 RX ORDER — ACETAMINOPHEN 500 MG
975 TABLET ORAL EVERY 8 HOURS
Refills: 0 | Status: DISCONTINUED | OUTPATIENT
Start: 2023-06-27 | End: 2023-06-27

## 2023-06-27 RX ORDER — SENNA PLUS 8.6 MG/1
2 TABLET ORAL AT BEDTIME
Refills: 0 | Status: DISCONTINUED | OUTPATIENT
Start: 2023-06-27 | End: 2023-06-27

## 2023-06-27 RX ORDER — LOSARTAN POTASSIUM 100 MG/1
50 TABLET, FILM COATED ORAL EVERY 24 HOURS
Refills: 0 | Status: DISCONTINUED | OUTPATIENT
Start: 2023-06-28 | End: 2023-06-29

## 2023-06-27 RX ADMIN — SIMVASTATIN 40 MILLIGRAM(S): 20 TABLET, FILM COATED ORAL at 23:15

## 2023-06-27 RX ADMIN — AMLODIPINE BESYLATE 2.5 MILLIGRAM(S): 2.5 TABLET ORAL at 23:22

## 2023-06-27 RX ADMIN — Medication 104 MILLIGRAM(S): at 17:22

## 2023-06-27 RX ADMIN — IOHEXOL 30 MILLILITER(S): 300 INJECTION, SOLUTION INTRAVENOUS at 16:56

## 2023-06-27 RX ADMIN — SODIUM CHLORIDE 500 MILLILITER(S): 9 INJECTION INTRAMUSCULAR; INTRAVENOUS; SUBCUTANEOUS at 16:51

## 2023-06-27 RX ADMIN — ONDANSETRON 4 MILLIGRAM(S): 8 TABLET, FILM COATED ORAL at 16:57

## 2023-06-27 NOTE — H&P ADULT - PROBLEM SELECTOR PLAN 8
F: PO  E: replete PRN  N: dash  DVT ppx: lovenox  GI PPx: PPI    FULL CODE    Dispo: [facility]

## 2023-06-27 NOTE — H&P ADULT - PROBLEM SELECTOR PLAN 1
Ongoing for over a week. Has been taking percocets at home. Neuro exam WNL.  - iSTOP  - PT eval Ongoing for over a week. Has been taking percocets at home. Neuro exam WNL.  - iSTOP  - lumbar x-ray  - PT eval Ongoing for over a week. Has been taking percocets at home. Neuro exam WNL. Ambulating around room comfortably. CT A&P showing L4-L5 canal stenosis vs compression fracture.  - iSTOP  - c/w percocet and bowel regimen  - CT thoracic and lumbar spine  - PT eval  - f/u ESR, CRP Ongoing for months. Has been taking percocets at home. Neuro exam WNL. Ambulating around room comfortably. CT A&P showing L4-L5 canal stenosis vs compression fracture.  - iSTOP  - c/w percocet and bowel regimen  - CT thoracic and lumbar spine  - PT eval  - f/u ESR, CRP Ongoing for months. Has been taking percocets at home. Neuro exam WNL. Ambulating around room comfortably. CT A&P showing L4-L5 canal stenosis, ddx also includes compression fracture.  - iSTOP  - c/w percocet and bowel regimen  - CT thoracic and lumbar spine  - PT eval

## 2023-06-27 NOTE — ED ADULT NURSE NOTE - OBJECTIVE STATEMENT
Pt reports R sided abdominal pain x 2 months with increased frequency of urination. Pt denies N/V/D/Fevers/Chills. Pt reports increasing headaches x 2 months with known history of brain tumor. Pt alert, oriented, ambulatory, and awake at time of assessment.

## 2023-06-27 NOTE — H&P ADULT - PROBLEM SELECTOR PLAN 6
ativan 2mg present in surescripts  - iSTOP  - c/w ativan 0.5mg prn qhs ativan 2mg present in surescripts  - iSTOP  - c/w ativan 1 mg prn qhs - c/w home simvastatin 40mg qD - c/w home simvastatin 40mg qD    #Hypothyroidism  - c/w home synthroid 75mcg

## 2023-06-27 NOTE — H&P ADULT - NSHPLABSRESULTS_GEN_ALL_CORE
9.7    4.44  )-----------( 144      ( 2023 16:54 )             31.3           144  |  112<H>  |  25<H>  ----------------------------<  100<H>  4.4   |  22  |  1.06    Ca    9.0      2023 16:54                Urinalysis Basic - ( 2023 18:01 )    Color: Yellow / Appearance: Clear / S.020 / pH: x  Gluc: x / Ketone: NEGATIVE  / Bili: Negative / Urobili: 0.2 E.U./dL   Blood: x / Protein: NEGATIVE mg/dL / Nitrite: NEGATIVE   Leuk Esterase: Trace / RBC: < 5 /HPF / WBC < 5 /HPF   Sq Epi: x / Non Sq Epi: x / Bacteria: Present /HPF            Lactate Trend            CAPILLARY BLOOD GLUCOSE

## 2023-06-27 NOTE — ED PROVIDER NOTE - CARE PLAN
1 Principal Discharge DX:	Abdominal pain   Principal Discharge DX:	Crohn's colitis  Secondary Diagnosis:	Abdominal pain

## 2023-06-27 NOTE — H&P ADULT - PROBLEM SELECTOR PLAN 7
F: PO  E: replete PRN  N: dash  DVT ppx: lovenox  GI PPx: PPI    FULL CODE    Dispo: [facility] ativan 2mg present in surescripts  - iSTOP  - c/w ativan 1 mg prn qhs ativan 2mg present in surescripts  - iSTOP  - c/w ativan 2 mg prn qhs ativan 2mg present in surescripts  - iSTOP  - c/w ativan 2 mg prn qhs > outpatient taper

## 2023-06-27 NOTE — H&P ADULT - PROBLEM SELECTOR PLAN 2
Has hx of crohn's dz. Not following with GI. Pain has been present for months. No signs of infection.   - pain control as above  - antiemetics as needed  - Has hx of crohn's dz. Not following with GI. Pain has been present for months. No signs of infection. Having bms. Pain does not appear related to eating. CT with signs of colitis.  - pain control as above  - antiemetics as needed  - consider GI for colitis Has hx of crohn's dz. Not following with GI. Pain has been present for months. No signs of infection. Having bms. Pain does not appear related to eating. On exam no cervantes's sign, pain appears diffuse. CT with signs of colitis but do not clinically suspect colitis. CT also showing biliary dilation.  - pain control as above  - antiemetics as needed  - f/u CMP  - consider RUQ US if indicated  - c/w home PPI Has hx of crohn's dz. Not following with GI. Pain has been present for months. No signs of infection. Having bms. Pain does not appear related to eating. On exam no cervantes's sign, pain appears diffuse. CT with signs of colitis but do not clinically suspect colitis. CT also showing biliary dilation however LFTs/juan wnl. neg murphys.   - pain control as above  - antiemetics as needed  - c/w home PPI  - consider GI pcr if diarrhea develops   - serial exams

## 2023-06-27 NOTE — ED PROVIDER NOTE - OBJECTIVE STATEMENT
The pt is a 89 y/o F, very poor historian, states "my doctor sent me in for admission" -- reports abd pain x 4 mon, no acute changes in any symptoms today. Pain to entire abd, 8/10, has been taking percocet ("for chronic pain"), + nausea, hx of crohn's - unable to recall last time she had seen her gi doctor. PMH brain tumor, htn, hld. Denies fevers, chills, cp, sob, emesis, diarrhea, dizziness, syncope.

## 2023-06-27 NOTE — H&P ADULT - HISTORY OF PRESENT ILLNESS
89F poor historian PMH HTN, meningioma, Crohn's, hypothyroidism presents for back pain ongoing for a week. She has been taking percocets for pain. Also c/o abdominal pain for the past 2 months and nausea. No fevers, chills, diarrhea. Has not seen GI recently. Has HHA.     In the ED VSS. Labs s/f anemia, thrombocytopenia. UA negative.   CT A&P s/f colitis involving the ascending colon and cecum of  infectious or inflammatory etiology. No bowel obstruction.  Received zofran, reglan, 500cc NS   89F poor historian PMH HTN, meningioma, Crohn's, hypothyroidism presents for back pain ongoing for months that has worsened in the last week. Pain is located in the lumbar area and does not radiate. She ambulates with a cane. She has been taking percocets for pain. Also c/o abdominal pain for the past 2 months and nausea as well as headache for the last 8-9 months. No fevers, chills, diarrhea. Has not seen GI recently. Has not followed up with anyone for meningioma. Has HHA.     In the ED VSS. Labs s/f anemia, thrombocytopenia. UA negative.   CT A&P s/f colitis involving the ascending colon and cecum of  infectious or inflammatory etiology. No bowel obstruction.  Received zofran, reglan, 500cc NS   89F poor historian PMH HTN, meningioma, Crohn's, hypothyroidism presents for back pain ongoing for months that has worsened in the last week. Pain is located in the lumbar area and does not radiate. She ambulates with a cane, denies recent fall. She has been taking percocets for pain. Also c/o abdominal pain for the past 2 months and nausea as well as headache for the last 8-9 months. No fevers, chills, diarrhea. Has not seen GI recently. Has not followed up with anyone for meningioma. Has HHA. Was sent in for admission by Dr. Neely.    In the ED VSS. Labs s/f anemia, thrombocytopenia. UA negative.   CT A&P s/f colitis involving the ascending colon and cecum of  infectious or inflammatory etiology. No bowel obstruction.  Received zofran, reglan, 500cc NS   89F poor historian PMH HTN, meningioma, Crohn's, hypothyroidism presents for back pain ongoing for months that has worsened in the last week. Pain is located in the lumbar area and does not radiate. She ambulates with a cane, denies recent fall. She has been taking percocets for pain. Also c/o abdominal pain for the past 2 months and nausea as well as headache for the last 8-9 months. No fevers, chills, diarrhea. Has not seen GI recently. Has not followed up with anyone for meningioma. Has A. Was sent in for admission by Dr. Neely.    In the ED VSS. Labs s/f anemia, thrombocytopenia. UA negative.   CT A&P s/f colitis involving the ascending colon and cecum of  infectious or inflammatory etiology. No bowel obstruction.  EKG nonischemic with normal QTc  Received zofran, reglan, 500cc NS

## 2023-06-27 NOTE — H&P ADULT - PROBLEM SELECTOR PLAN 5
- c/w home simvastatin 40mg qD - c/w losartan 50mg, toprol 50mg, amlodipine 2.5mg  - med rec in AM - c/w losartan 50mg, toprol 50mg, amlodipine 2.5mg, hydral 10mg TID  - also taking felodipine 5mg BID > would dc and increase amlodipine if needed

## 2023-06-27 NOTE — H&P ADULT - PROBLEM SELECTOR PLAN 3
c/o of headaches for the last 8-9 months.  - consider CT head new anemia compared to labs from 2017  - f/u iron studies, B12, folate

## 2023-06-27 NOTE — ED ADULT TRIAGE NOTE - CHIEF COMPLAINT QUOTE
Patient c/o of days of chronic back, chest and abdominal pain, no nausea/vomitting/diarrhea, states has Hx of brain tumor and sent to ED by Dr. Green.

## 2023-06-27 NOTE — H&P ADULT - NSHPPHYSICALEXAM_GEN_ALL_CORE
.  VITAL SIGNS:  T(C): 36.6 (06-27-23 @ 16:12), Max: 36.6 (06-27-23 @ 16:12)  T(F): 97.8 (06-27-23 @ 16:12), Max: 97.8 (06-27-23 @ 16:12)  HR: 97 (06-27-23 @ 21:12) (74 - 97)  BP: 155/88 (06-27-23 @ 21:12) (154/67 - 155/88)  BP(mean): --  RR: 16 (06-27-23 @ 21:12) (16 - 18)  SpO2: 94% (06-27-23 @ 21:12) (94% - 95%)  Wt(kg): --    PHYSICAL EXAM:    Constitutional: resting comfortably in bed; NAD  Head: NC/AT  Eyes: EOMI, anicteric sclera  ENT: no nasal discharge; MMM  Neck: supple; no JVD or thyromegaly  Respiratory: CTA B/L; no W/R/R  Cardiac: +S1/S2; RRR; no M/R/G  Gastrointestinal: abdomen soft, NT/ND; no rebound or guarding  Back: no CVAT B/L  Extremities: WWP, no clubbing or cyanosis; no peripheral edema  Musculoskeletal: moving all extremities  Vascular: 2+ radial, DP/PT pulses B/L  Lymphatic: no submandibular or cervical LAD  Neurologic: AAOx3; no focal deficits  Psychiatric: affect and characteristics of appearance, verbalizations, behaviors are appropriate .  VITAL SIGNS:  T(C): 36.6 (06-27-23 @ 16:12), Max: 36.6 (06-27-23 @ 16:12)  T(F): 97.8 (06-27-23 @ 16:12), Max: 97.8 (06-27-23 @ 16:12)  HR: 97 (06-27-23 @ 21:12) (74 - 97)  BP: 155/88 (06-27-23 @ 21:12) (154/67 - 155/88)  BP(mean): --  RR: 16 (06-27-23 @ 21:12) (16 - 18)  SpO2: 94% (06-27-23 @ 21:12) (94% - 95%)  Wt(kg): --    PHYSICAL EXAM:    Constitutional: resting comfortably in bed; NAD; frail  Head: NC/AT  Eyes: EOMI, anicteric sclera  ENT: no nasal discharge; dry mm  Neck: supple; no JVD or thyromegaly  Respiratory: CTA B/L; no W/R/R  Cardiac: +S1/S2; RRR  Gastrointestinal: abdomen soft, NT/ND; no rebound or guarding  Back: no CVAT B/L  Extremities: WWP, no clubbing or cyanosis; trace pedal edema; R big toe with corn  Musculoskeletal: moving all extremities  Vascular: 2+ radial, DP/PT pulses B/L  Lymphatic: no submandibular or cervical LAD  Neurologic: AAOx3; no focal deficits  Psychiatric: affect and characteristics of appearance, verbalizations, behaviors are appropriate .  VITAL SIGNS:  T(C): 36.6 (06-27-23 @ 16:12), Max: 36.6 (06-27-23 @ 16:12)  T(F): 97.8 (06-27-23 @ 16:12), Max: 97.8 (06-27-23 @ 16:12)  HR: 97 (06-27-23 @ 21:12) (74 - 97)  BP: 155/88 (06-27-23 @ 21:12) (154/67 - 155/88)  BP(mean): --  RR: 16 (06-27-23 @ 21:12) (16 - 18)  SpO2: 94% (06-27-23 @ 21:12) (94% - 95%)  Wt(kg): --    PHYSICAL EXAM:    Constitutional: resting comfortably in bed; NAD; frail  Head: NC/AT  Eyes: EOMI, anicteric sclera  ENT: no nasal discharge; dry mm  Neck: supple; no JVD or thyromegaly  Respiratory: CTA B/L; no W/R/R  Cardiac: +S1/S2; RRR  Gastrointestinal: abdomen soft, NT/ND; no rebound or guarding  Back: no CVAT B/L, +lumbar spine tenderness  Extremities: WWP, no clubbing or cyanosis; trace pedal edema; R big toe with corn  Musculoskeletal: moving all extremities  Vascular: 2+ radial, DP/PT pulses B/L  Lymphatic: no submandibular or cervical LAD  Neurologic: AAOx3; no focal deficits  Psychiatric: affect and characteristics of appearance, verbalizations, behaviors are appropriate .  VITAL SIGNS:  T(C): 36.6 (06-27-23 @ 16:12), Max: 36.6 (06-27-23 @ 16:12)  T(F): 97.8 (06-27-23 @ 16:12), Max: 97.8 (06-27-23 @ 16:12)  HR: 97 (06-27-23 @ 21:12) (74 - 97)  BP: 155/88 (06-27-23 @ 21:12) (154/67 - 155/88)  BP(mean): --  RR: 16 (06-27-23 @ 21:12) (16 - 18)  SpO2: 94% (06-27-23 @ 21:12) (94% - 95%)  Wt(kg): --    PHYSICAL EXAM:    Constitutional: resting comfortably in bed; NAD; frail  Head: NC/AT  Eyes: EOMI, anicteric sclera  ENT: no nasal discharge; dry mm  Neck: supple; no JVD or thyromegaly  Respiratory: CTA B/L; no W/R/R  Cardiac: +S1/S2; RRR  Gastrointestinal: abdomen soft, +RLQ mild tenderness, ND; no rebound or guarding  Back: no CVAT B/L, +lumbar spine tenderness  Extremities: WWP, no clubbing or cyanosis; trace pedal edema; R big toe with corn  Musculoskeletal: moving all extremities  Vascular: 2+ radial, DP/PT pulses B/L  Lymphatic: no submandibular or cervical LAD  Neurologic: AAOx3; no focal deficits  Psychiatric: affect and characteristics of appearance, verbalizations, behaviors are appropriate

## 2023-06-27 NOTE — ED PROVIDER NOTE - CLINICAL SUMMARY MEDICAL DECISION MAKING FREE TEXT BOX
pt c/o abd pain x 4 mon, no acute changes in symptoms today, states that was sent in by pmd for admission, no emesis/diarrhea/fevers, well appearing, non toxic, min tenderness on exam, labs grossly wnl, will get ct to r/o constipation vs mass vs diverticulitis vs appy, signed out to dr schneider pending ct results and dispo

## 2023-06-27 NOTE — H&P ADULT - PROBLEM SELECTOR PLAN 4
- c/w losartan 50mg, toprol 50mg, amlodipine 2.5mg  - med rec in AM c/o of headaches for the last 8-9 months.  - consider CT head c/o of headaches for the last 8-9 months. Neuro exam WNL.

## 2023-06-27 NOTE — ED ADULT TRIAGE NOTE - GLASGOW COMA SCALE: BEST VERBAL RESPONSE, MLM
Telephone Encounter by Lynda Chavez at 17 02:47 PM     Author:  Lynda Chavez Service:  (none) Author Type:  Patient      Filed:  17 02:48 PM Encounter Date:  2017 Status:  Signed     :  Lynda Chavez (Patient )              ZEE YIP    Patient Age: 68 year old   Refill request by:[KR1.1T] Pharmacy fax[KR1.1M]  Refill to be:[KR1.1T] Faxed to[KR1.1M]   Pharmacy     [facility]    [address] [facility] [place] [address]    Phone: [phone][KR1.2T]          Medication requested to be refilled:[KR1.1T]   Requested Prescriptions     Pending Prescriptions Disp Refills   • glucose blood (ACCU-CHEK SMARTVIEW) test strip 100 Strip 0     Si (two) times daily. Test as directed.   • ACCU-CHEK FASTCLIX LANCETS MISC 100 Each 0[KR1.2T]           Next and Last Visit with Provider and Department  Next visit with UYEN BLACK is on No match found  Next visit with [facility] is on No match found   Last visit with UYEN BLACK was on 2017 at 10:10 AM in [facility]  Last visit with [facility] was on 2017 at 10:10 AM in [facility]      WEIGHT AND HEIGHT: As of 2017 weight is 145 lbs.(65.772 kg). Height is 5' 8\"(1.727 m).   BMI is 22.05 kg/(m^2) calculated from:     Height 5' 8\" (1.727 m) as of 17     Weight 145 lb (65.772 kg) as of 17[KR1.1T]      No Known Allergies[KR1.2T]  Current outpatient prescriptions       Medication  Sig Dispense Refill   • simvastatin (ZOCOR) 10 MG tablet Take 1 Tab by mouth nightly. at bedtime 90 Tab 3   • pantoprazole (PROTONIX) 20 MG tablet Take 1 Tab by mouth daily. 90 Tab 3   • metformin (GLUCOPHAGE) 500 MG tablet Take 1 Tab by mouth daily with breakfast. 90 Tab 1   • losartan (COZAAR) 50 MG tablet Take 1 Tab by mouth daily. 90 Tab 3   • Tamsulosin HCl 0.4 MG CAPS Take 1 Cap by mouth daily. 30 Cap 6   •  Linaclotide 145 MCG CAPS Take 1 Cap by mouth daily. 30 Each 0   • nitroGLYCERIN (NITROSTAT) 0.4 MG SL tablet Place 1 Tab under the tongue every 5 (five) minutes as needed for Chest pain. Take Up To 3 Per Episode Of Chest Pain. 25 Tab 6   • aspirin 81 MG tablet Take 1 Tab by mouth daily.     • Multiple Vitamin (MULTIVITAMIN OR) Take  by mouth.     • CUSTOM FORMULATION -- SEE SIG Diabetic support supplement          ROUTING:[KR1.1T] Patient's physician/staff[KR1.1M]        PCP: Christos Tracy MD         INS: Payor: [employer] O FFS / Plan: N/A / Product Type: *No Product type* / Note: This is the primary coverage, but no account was found for this location or the patient's primary location.   ADDRESS:  [address] [place] [id number][KR1.1T]       Revision History        User Key Date/Time User Provider Type Action    > KR1.2 07/25/17 02:48 PM Lynda Chavez Patient  Sign     KR1.1 07/25/17 02:47 PM Lynda Chavez Patient      M - Manual, T - Template             (V5) oriented

## 2023-06-27 NOTE — ED ADULT NURSE NOTE - NSFALLHARMRISKINTERV_ED_ALL_ED

## 2023-06-27 NOTE — H&P ADULT - ASSESSMENT
89F poor historian PMH HTN, meningioma, Crohn's, hypothyroidism presents for back pain ongoing for a week. Admitted for pain control. 89F poor historian PMH HTN, meningioma, Crohn's, hypothyroidism presents for back pain ongoing for a week and abdominal pain going on for months. CT A&P showing colitis.  Admitted for pain control. 89F poor historian PMH HTN, meningioma, Crohn's, hypothyroidism presents for back pain ongoing for a week and abdominal pain going on for months. Admitted for pain control.

## 2023-06-27 NOTE — H&P ADULT - ATTENDING COMMENTS
#Back pain: lower back pain chronic for months. No neuro deficits, denies sciatica. CTAP w/ lumbar degenerative disc dx. F/up T/lumbar spine xray r/o compression fx. c/w pain control. PT/SW     #Abd pain: p/w chronic abd pain. Mild RLQ tenderness, no rebound. CT c/f colitis however denies any diarhea/fevers/chills. No leukocytosis. Low c/f infectious colitis and/or chrons flare. Consider GI pcr if diarhea develops, serial exams, f/up esr/crp, consider gi consult #Back pain: lower back pain chronic for months. No neuro deficits, denies sciatica. CTAP w/ lumbar degenerative disc dx. F/up T/lumbar spine xray r/o compression fx. c/w pain control. PT/SW     #Abd pain: p/w chronic abd pain. Mild RLQ tenderness, no rebound. CT c/f colitis however denies any diarhea/fevers/chills. No leukocytosis. Low c/f infectious colitis and/or chrons flare. Consider GI pcr if diarrhea develops, serial exams, f/up lactate, esr/crp, consider gi consult

## 2023-06-28 ENCOUNTER — TRANSCRIPTION ENCOUNTER (OUTPATIENT)
Age: 88
End: 2023-06-28

## 2023-06-28 DIAGNOSIS — E03.9 HYPOTHYROIDISM, UNSPECIFIED: ICD-10-CM

## 2023-06-28 LAB
ALBUMIN SERPL ELPH-MCNC: 3.7 G/DL — SIGNIFICANT CHANGE UP (ref 3.3–5)
ALBUMIN SERPL ELPH-MCNC: 3.7 G/DL — SIGNIFICANT CHANGE UP (ref 3.3–5)
ALP SERPL-CCNC: 61 U/L — SIGNIFICANT CHANGE UP (ref 40–120)
ALP SERPL-CCNC: 62 U/L — SIGNIFICANT CHANGE UP (ref 40–120)
ALT FLD-CCNC: 8 U/L — LOW (ref 10–45)
ALT FLD-CCNC: 9 U/L — LOW (ref 10–45)
ANION GAP SERPL CALC-SCNC: 12 MMOL/L — SIGNIFICANT CHANGE UP (ref 5–17)
AST SERPL-CCNC: 14 U/L — SIGNIFICANT CHANGE UP (ref 10–40)
AST SERPL-CCNC: 15 U/L — SIGNIFICANT CHANGE UP (ref 10–40)
BASOPHILS # BLD AUTO: 0.04 K/UL — SIGNIFICANT CHANGE UP (ref 0–0.2)
BASOPHILS NFR BLD AUTO: 0.8 % — SIGNIFICANT CHANGE UP (ref 0–2)
BILIRUB DIRECT SERPL-MCNC: <0.2 MG/DL — SIGNIFICANT CHANGE UP (ref 0–0.3)
BILIRUB INDIRECT FLD-MCNC: SIGNIFICANT CHANGE UP MG/DL (ref 0.2–1)
BILIRUB SERPL-MCNC: <0.2 MG/DL — SIGNIFICANT CHANGE UP (ref 0.2–1.2)
BILIRUB SERPL-MCNC: <0.2 MG/DL — SIGNIFICANT CHANGE UP (ref 0.2–1.2)
BUN SERPL-MCNC: 19 MG/DL — SIGNIFICANT CHANGE UP (ref 7–23)
CALCIUM SERPL-MCNC: 8.7 MG/DL — SIGNIFICANT CHANGE UP (ref 8.4–10.5)
CHLORIDE SERPL-SCNC: 106 MMOL/L — SIGNIFICANT CHANGE UP (ref 96–108)
CO2 SERPL-SCNC: 21 MMOL/L — LOW (ref 22–31)
CREAT SERPL-MCNC: 0.99 MG/DL — SIGNIFICANT CHANGE UP (ref 0.5–1.3)
CRP SERPL-MCNC: <3 MG/L — SIGNIFICANT CHANGE UP (ref 0–4)
EGFR: 53 ML/MIN/1.73M2 — LOW
EOSINOPHIL # BLD AUTO: 0.14 K/UL — SIGNIFICANT CHANGE UP (ref 0–0.5)
EOSINOPHIL NFR BLD AUTO: 2.8 % — SIGNIFICANT CHANGE UP (ref 0–6)
ERYTHROCYTE [SEDIMENTATION RATE] IN BLOOD: 8 MM/HR — SIGNIFICANT CHANGE UP
GLUCOSE SERPL-MCNC: 139 MG/DL — HIGH (ref 70–99)
HCT VFR BLD CALC: 31.2 % — LOW (ref 34.5–45)
HGB BLD-MCNC: 9.8 G/DL — LOW (ref 11.5–15.5)
IMM GRANULOCYTES NFR BLD AUTO: 0.2 % — SIGNIFICANT CHANGE UP (ref 0–0.9)
LYMPHOCYTES # BLD AUTO: 2.21 K/UL — SIGNIFICANT CHANGE UP (ref 1–3.3)
LYMPHOCYTES # BLD AUTO: 43.6 % — SIGNIFICANT CHANGE UP (ref 13–44)
MAGNESIUM SERPL-MCNC: 1.8 MG/DL — SIGNIFICANT CHANGE UP (ref 1.6–2.6)
MCHC RBC-ENTMCNC: 27.5 PG — SIGNIFICANT CHANGE UP (ref 27–34)
MCHC RBC-ENTMCNC: 31.4 GM/DL — LOW (ref 32–36)
MCV RBC AUTO: 87.6 FL — SIGNIFICANT CHANGE UP (ref 80–100)
MONOCYTES # BLD AUTO: 0.54 K/UL — SIGNIFICANT CHANGE UP (ref 0–0.9)
MONOCYTES NFR BLD AUTO: 10.7 % — SIGNIFICANT CHANGE UP (ref 2–14)
NEUTROPHILS # BLD AUTO: 2.13 K/UL — SIGNIFICANT CHANGE UP (ref 1.8–7.4)
NEUTROPHILS NFR BLD AUTO: 41.9 % — LOW (ref 43–77)
NRBC # BLD: 0 /100 WBCS — SIGNIFICANT CHANGE UP (ref 0–0)
PLATELET # BLD AUTO: 148 K/UL — LOW (ref 150–400)
POTASSIUM SERPL-MCNC: 4 MMOL/L — SIGNIFICANT CHANGE UP (ref 3.5–5.3)
POTASSIUM SERPL-SCNC: 4 MMOL/L — SIGNIFICANT CHANGE UP (ref 3.5–5.3)
PROT SERPL-MCNC: 6.3 G/DL — SIGNIFICANT CHANGE UP (ref 6–8.3)
PROT SERPL-MCNC: 6.6 G/DL — SIGNIFICANT CHANGE UP (ref 6–8.3)
RBC # BLD: 3.56 M/UL — LOW (ref 3.8–5.2)
RBC # FLD: 15.7 % — HIGH (ref 10.3–14.5)
SODIUM SERPL-SCNC: 139 MMOL/L — SIGNIFICANT CHANGE UP (ref 135–145)
WBC # BLD: 5.07 K/UL — SIGNIFICANT CHANGE UP (ref 3.8–10.5)
WBC # FLD AUTO: 5.07 K/UL — SIGNIFICANT CHANGE UP (ref 3.8–10.5)

## 2023-06-28 PROCEDURE — 72131 CT LUMBAR SPINE W/O DYE: CPT | Mod: 26

## 2023-06-28 PROCEDURE — 99233 SBSQ HOSP IP/OBS HIGH 50: CPT | Mod: GC

## 2023-06-28 PROCEDURE — 93010 ELECTROCARDIOGRAM REPORT: CPT

## 2023-06-28 PROCEDURE — 72128 CT CHEST SPINE W/O DYE: CPT | Mod: 26

## 2023-06-28 RX ORDER — MAGNESIUM SULFATE 500 MG/ML
2 VIAL (ML) INJECTION ONCE
Refills: 0 | Status: COMPLETED | OUTPATIENT
Start: 2023-06-28 | End: 2023-06-28

## 2023-06-28 RX ORDER — POLYETHYLENE GLYCOL 3350 17 G/17G
17 POWDER, FOR SOLUTION ORAL DAILY
Refills: 0 | Status: DISCONTINUED | OUTPATIENT
Start: 2023-06-28 | End: 2023-06-28

## 2023-06-28 RX ORDER — IRON SUCROSE 20 MG/ML
300 INJECTION, SOLUTION INTRAVENOUS EVERY 24 HOURS
Refills: 0 | Status: DISCONTINUED | OUTPATIENT
Start: 2023-06-28 | End: 2023-06-29

## 2023-06-28 RX ORDER — ENOXAPARIN SODIUM 100 MG/ML
30 INJECTION SUBCUTANEOUS EVERY 24 HOURS
Refills: 0 | Status: DISCONTINUED | OUTPATIENT
Start: 2023-06-28 | End: 2023-06-29

## 2023-06-28 RX ORDER — SENNA PLUS 8.6 MG/1
2 TABLET ORAL AT BEDTIME
Refills: 0 | Status: DISCONTINUED | OUTPATIENT
Start: 2023-06-28 | End: 2023-06-28

## 2023-06-28 RX ORDER — LEVOTHYROXINE SODIUM 125 MCG
88 TABLET ORAL DAILY
Refills: 0 | Status: DISCONTINUED | OUTPATIENT
Start: 2023-06-28 | End: 2023-06-29

## 2023-06-28 RX ADMIN — Medication 25 GRAM(S): at 16:02

## 2023-06-28 RX ADMIN — Medication 2 MILLIGRAM(S): at 21:51

## 2023-06-28 RX ADMIN — AMLODIPINE BESYLATE 2.5 MILLIGRAM(S): 2.5 TABLET ORAL at 21:51

## 2023-06-28 RX ADMIN — IRON SUCROSE 176.67 MILLIGRAM(S): 20 INJECTION, SOLUTION INTRAVENOUS at 19:03

## 2023-06-28 RX ADMIN — LOSARTAN POTASSIUM 50 MILLIGRAM(S): 100 TABLET, FILM COATED ORAL at 09:24

## 2023-06-28 RX ADMIN — POLYETHYLENE GLYCOL 3350 17 GRAM(S): 17 POWDER, FOR SOLUTION ORAL at 11:51

## 2023-06-28 RX ADMIN — Medication 10 MILLIGRAM(S): at 13:18

## 2023-06-28 RX ADMIN — Medication 10 MILLIGRAM(S): at 06:31

## 2023-06-28 RX ADMIN — SIMVASTATIN 40 MILLIGRAM(S): 20 TABLET, FILM COATED ORAL at 21:51

## 2023-06-28 RX ADMIN — PANTOPRAZOLE SODIUM 40 MILLIGRAM(S): 20 TABLET, DELAYED RELEASE ORAL at 06:31

## 2023-06-28 RX ADMIN — Medication 88 MICROGRAM(S): at 13:19

## 2023-06-28 RX ADMIN — Medication 10 MILLIGRAM(S): at 21:50

## 2023-06-28 RX ADMIN — Medication 50 MILLIGRAM(S): at 11:51

## 2023-06-28 NOTE — DISCHARGE NOTE PROVIDER - NSDCFUSCHEDAPPT_GEN_ALL_CORE_FT
Maximo Martinez  [facility]  GASTRO [address] [address] [facility]  Scheduled Appointment: 07/06/2023

## 2023-06-28 NOTE — DISCHARGE NOTE PROVIDER - CARE PROVIDER_API CALL
Maximo Martinez  Gastroenterology  [address]  [place] [address]  Phone: [phone]  Fax: [phone]  Follow Up Time: 2 weeks

## 2023-06-28 NOTE — DISCHARGE NOTE PROVIDER - ATTENDING DISCHARGE PHYSICAL EXAMINATION:
Physical exam:  GENERAL: NAD, lying in bed comfortably  HEAD:  Atraumatic, Normocephalic  EYES: EOMI, PERRLA, conjunctiva and sclera clear  ENT: Moist mucous membranes  NECK: Supple, No JVD  CHEST/LUNG: Clear to auscultation bilaterally; No rales, rhonchi, wheezing, or rubs.  HEART: Regular rate and rhythm; S1+ S2+   ABDOMEN: Bowel sounds present; Soft, mildly TTp on deep palpation in RLQ, Nondistended   EXTREMITIES:  2+ Peripheral Pulses, brisk capillary refill. No clubbing, cyanosis, or edema  NERVOUS SYSTEM:  Alert & Oriented , speech clear   MSK: FROM all 4 extremities, full and equal strength  SKIN: No rashes or lesions    REYES: s/p iv iron - pt will be discharged with oral iron replacement - will fu GI if she wishes to proceed with further work up   HPT per PT recs  GI: pt not on crohns medications at home: states she has not been on meds due to intolerance. Pt will fu with GI out pt if symptoms are not managed   neuro: No acute deficits: MRI was ordered but pt would not like to  stay and states she would not proceed with any surgical intervention if abnormal- no benefit of MRI inpt and hence discontinued   Podiatry: seen by podiatry and advised to fu outpt

## 2023-06-28 NOTE — DISCHARGE NOTE PROVIDER - HOSPITAL COURSE
Assessment and Plan:   · Assessment	  89F poor historian PMH HTN, meningioma, Crohn's, hypothyroidism presents for back pain ongoing for a week and abdominal pain going on for months. Pain well controlled by meds.       Problem/Plan - 1:  ·  Problem: Back pain.   ·  Plan: Ongoing for months. Has been taking percocets at home. Neuro exam WNL. Ambulating around room comfortably. CT A&P showing L4-L5 canal stenosis, ddx also includes compression fracture.  - iSTOP  - c/w percocet and bowel regimen  - CT thoracic and lumbar spine        Problem/Plan - 2:  ·  Problem: H/O abdominal pain.   ·  Plan: Has hx of crohn's dz. Not following with GI. Pain has been present for months. No signs of infection. Having bms. Pain does not appear related to eating. On exam no cervantes's sign, pain appears diffuse. CT with signs of colitis but do not clinically suspect colitis. CT also showing biliary dilation however LFTs/juan wnl. neg murphys.   - pain control as above  - antiemetics as needed  - c/w home PPI  - consider GI pcr if diarrhea develops   - pt states she did not tolerate crohns meds in the past an d has been off of meds   - serial exams.     Problem/Plan - 3:  ·  Problem: Anemia.   ·  Plan: new anemia compared to labs from 2017  - iron studies  outpt w REYES   will give iv iron 300 x 3 days.     Problem/Plan - 4:  ·  Problem: Meningioma.   ·  Plan: c/o of headaches for the last 8-9 months. Neuro exam WNL.  fu MRI   No acute interventions.     Problem/Plan - 5:  ·  Problem: Hypertension.   ·  Plan: - c/w losartan 50mg, toprol 50mg, amlodipine 2.5mg, hydral 10mg TID  - also taking felodipine 5mg BID > would dc and increase amlodipine if needed.     Problem/Plan - 6:  ·  Problem: HLD (hyperlipidemia).   ·  Plan: - c/w home simvastatin 40mg qD.     Problem/Plan - 7:  ·  Problem: Hypothyroidism.   ·  Plan: #Hypothyroidism  - c/w home synthroid 88mcg.     Problem/Plan - 8:  ·  Problem: History of benzodiazepine use.   ·  Plan: ativan 2mg present in surescripts  - iSTOP  - c/w ativan 2 mg prn qhs > outpatient taper.     Problem/Plan - 9:  ·  Problem: Prophylactic measure.   ·  Plan: F: PO  E: replete PRN  N: dash  DVT ppx: lovenox  GI PPx: PPI     Assessment and Plan:   · Assessment	  89F poor historian PMH HTN, meningioma, Crohn's, hypothyroidism presents for back pain ongoing for a week and abdominal pain going on for months. Pain well controlled by meds.    Hospital course (by problem):  #Back pain.   Ongoing for months. Has been taking percocets at home. Neuro exam WNL. Ambulating around room comfortably. CT A&P showing L4-L5 canal stenosis, ddx also includes compression fracture.  Continue with percocet and bowel regimen  - CT thoracic and lumbar spine ordered prior to discharge, however did not result.       #H/O abdominal pain.   Patient has a historyof crohn's disease. Not following with GI. Pain has been present for months. No signs of infection. Having bms. Pain does not appear related to eating. On exam no cervantes's sign, pain appears diffuse. CT with signs of colitis but do not clinically suspect colitis. CT also showing biliary dilation however LFTs/juan wnl. negative murphys.   Pain control as above and use antiemetics as needed. Continue with home PPI  - Patient states she did not tolerate crohns meds in the past and does not wish to resume again.     #Anemia.   New anemia compared to labs from 2017  -IV iron 300 was given for 3 days.     #Meningioma.   Patient complains of headaches for the last 8-9 months. Neuro exam WNL.  Follow up MRI head for ordered prior to discharge, however did not result. No acute interventions.    #Hypertension.   Continue with losartan 50mg, toprol 50mg, amlodipine 2.5mg, hydral 10mg TID  - also taking felodipine 5mg BID > would dc and increase amlodipine if needed.     #HLD (hyperlipidemia).    Continue with home simvastatin 40mg qD.    #Hypothyroidism.   Continue home synthroid 88mcg.    #History of benzodiazepine use.   Ativan 2mg was present in surescripts  Continue with ativan 2 mg prn qhs > outpatient taper.    Patient was discharged to: home with PT    New medications: none  Changes to old medications:  Medications that were stopped: none    Items to follow up as outpatient:  PCP: Chron;s disease     Physical exam at the time of discharge:  GENERAL: NAD  HEENT: Normocephalic;  conjunctivae and sclerae clear; moist mucous membranes;   NECK : supple  CHEST/LUNG: Clear to auscultation bilaterally with good air entry   HEART: S1 S2  regular; + sys murmur   ABDOMEN: Soft, slightly tender, Nondistended; Bowel sounds present  EXTREMITIES: no cyanosis; no edema; no calf tenderness  SKIN: warm and dry; no rash  NERVOUS SYSTEM:  Awake and alert; Oriented  to place, person and time        89F poor historian PMH HTN, meningioma, Crohn's, hypothyroidism presents for back pain ongoing for a week and abdominal pain going on for months. Pain was well controlled by meds.    Hospital Course (by problem):   #Back pain.   Ongoing for months. Has been taking percocets at home. Neuro exam WNL. Ambulating around room comfortably. CT A&P showing L4-L5 canal stenosis, ddx also includes compression fracture.  Continue with percocet and bowel regimen. CT thoracic and lumbar spine showed no significant canal or foraminal stenosis of the thoracic spine.    #H/O abdominal pain.   Patient has a history of Crohn's disease. Not following with GI so scheduled an appointment. Pain has been present for months. No signs of infection. Having regular bowel movement. Pain does not appear related to eating. On exam no cervantes's sign, pain appears diffuse. CT with signs of colitis but do not clinically suspect colitis. CT also showing biliary dilation however LFTs/juan wnl. negative murphys.   Pain control as above and use antiemetics as needed. Continue with home PPI. Patient mentioned some difficulty with eating and food getting stuck, will prescribe Reglan to help with symptoms.   - Patient states she did not tolerate crohns meds in the past and does not wish to resume again.     #Anemia.   New anemia compared to labs from 2017  -IV iron 300 was given for 2 days.     #Meningioma.   Patient complains of headaches for the last 8-9 months. Neuro exam WNL.  Follow up MRI head for ordered prior to discharge, however did not result. No acute interventions.    #Hypertension.   Continue with losartan 50mg, toprol 50mg, amlodipine 2.5mg, hydral 10mg TID  - also taking felodipine 5mg BID > would dc and increase amlodipine if needed.     #HLD (hyperlipidemia).    Continue with home simvastatin 40mg qD.    #Hypothyroidism.   Continue home synthroid 88mcg.    #History of benzodiazepine use.   Ativan 2mg was present in surescripts  Continue with ativan 2 mg prn qhs > outpatient taper.    Patient was discharged to: home with PT    New medications: none  Changes to old medications:  Medications that were stopped: none    Items to follow up as outpatient:  PCP: Chron;s disease     Physical exam at the time of discharge:  GENERAL: NAD  HEENT: Normocephalic;  conjunctivae and sclerae clear; moist mucous membranes;   NECK : supple  CHEST/LUNG: Clear to auscultation bilaterally with good air entry   HEART: S1 S2  regular; + sys murmur   ABDOMEN: Soft, slightly tender, Nondistended; Bowel sounds present  EXTREMITIES: no cyanosis; no edema; no calf tenderness  SKIN: warm and dry; no rash  NERVOUS SYSTEM: Awake and alert; Oriented  to place, person and time 89F poor historian PMH HTN, meningioma, Crohn's, hypothyroidism presents for back pain ongoing for a week and abdominal pain going on for months. Pain was well controlled by meds.    Hospital Course (by problem):   #Back pain.   Ongoing for months. Has been taking percocets at home. Neuro exam WNL. Ambulating around room comfortably. CT A&P showing L4-L5 canal stenosis, ddx also includes compression fracture.  Continue with percocet and bowel regimen. CT thoracic and lumbar spine showed no significant canal or foraminal stenosis of the thoracic spine.    #H/O abdominal pain.   Patient has a history of Crohn's disease. Not following with GI so scheduled an appointment. Pain has been present for months. No signs of infection. Having regular bowel movement. Pain does not appear related to eating. On exam no cervantes's sign, pain appears diffuse. CT with signs of colitis but do not clinically suspect colitis. CT also showing biliary dilation however LFTs/juan wnl. negative murphys.   Pain control as above. Continue with home PPI. Patient mentioned some difficulty with eating and food getting stuck, will prescribe Reglan to help with symptoms.   - Patient states she did not tolerate crohns meds in the past and does not wish to resume again.     #Anemia.   New anemia compared to labs from 2017  -IV iron 300 was given for 2 days, will send home with iron tablets for 30 days.     #Meningioma.   Patient complains of headaches for the last 8-9 months. Neuro exam WNL.  Follow up MRI head for ordered prior to discharge, however did not result. No acute interventions.    #Hypertension.   Continue with losartan 50mg, toprol 50mg, amlodipine 2.5mg, hydral 10mg TID     #HLD (hyperlipidemia).    Continue with home simvastatin 40mg qD.    #Hypothyroidism.   Continue home synthroid 88mcg.    #History of benzodiazepine use.   Ativan 2mg was present in surescripts  Continue with ativan 2 mg prn qhs > outpatient taper.    Patient was discharged to: home with PT    New medications: Reglan 5 mg and Iron tablets.     Items to follow up as outpatient:  PCP: Chron;s disease     Physical exam at the time of discharge:  GENERAL: NAD  HEENT: Normocephalic;  conjunctivae and sclerae clear; moist mucous membranes;   NECK : supple  CHEST/LUNG: Clear to auscultation bilaterally with good air entry   HEART: S1 S2  regular; + sys murmur   ABDOMEN: Soft, slightly tender, Nondistended; Bowel sounds present  EXTREMITIES: no cyanosis; no edema; no calf tenderness  SKIN: warm and dry; no rash  NERVOUS SYSTEM: Awake and alert; Oriented  to place, person and time 89F poor historian PMH HTN, meningioma, Crohn's, hypothyroidism presents for back pain ongoing for a week and abdominal pain going on for months. Pain was well controlled by meds.    Hospital Course (by problem):   #Back pain.   Ongoing for months. Has been taking percocets at home. Neuro exam WNL. Ambulating around room comfortably. CT A&P showing L4-L5 canal stenosis, ddx also includes compression fracture.  Continue with percocet and bowel regimen. CT thoracic and lumbar spine showed no significant canal or foraminal stenosis of the thoracic spine.    #H/O abdominal pain.   Patient has a history of Crohn's disease. Not following with GI so scheduled an appointment. Pain has been present for months. No signs of infection. Having regular bowel movement. Pain does not appear related to eating. On exam no cervantes's sign, pain appears diffuse. CT with signs of colitis but do not clinically suspect colitis. CT also showing biliary dilation however LFTs/juan wnl. negative murphys.   Pain control as above. Continue with home PPI. Patient mentioned some difficulty with eating and food getting stuck, will prescribe Reglan to help with symptoms.   - Patient states she did not tolerate crohns meds in the past and does not wish to resume again.     #Anemia.   New anemia compared to labs from 2017  -IV iron 300 was given for 2 days, will send home with iron tablets for 30 days.     #Meningioma.   Patient complains of headaches for the last 8-9 months. Neuro exam WNL.  MRI head for ordered prior to discharge, however did not result. Pt also states she would not liek intervention if there is something on MRI     #Hypertension.   Continue with losartan 50mg, toprol 50mg, amlodipine 2.5mg, hydral 10mg TID     #HLD (hyperlipidemia).    Continue with home simvastatin 40mg qD.    #Hypothyroidism.   Continue home synthroid 88mcg.    #History of benzodiazepine use.   Ativan 2mg was present in surescripts  Continue with ativan 2 mg prn qhs > outpatient taper.    Patient was discharged to: home with PT    New medications: Reglan 5 mg and Iron tablets.     Items to follow up as outpatient:  PCP: Chron;s disease     Physical exam at the time of discharge:  GENERAL: NAD  HEENT: Normocephalic;  conjunctivae and sclerae clear; moist mucous membranes;   NECK : supple  CHEST/LUNG: Clear to auscultation bilaterally with good air entry   HEART: S1 S2  regular; + sys murmur   ABDOMEN: Soft, slightly tender, Nondistended; Bowel sounds present  EXTREMITIES: no cyanosis; no edema; no calf tenderness  SKIN: warm and dry; no rash  NERVOUS SYSTEM: Awake and alert; Oriented  to place, person and time

## 2023-06-28 NOTE — PROGRESS NOTE ADULT - PROBLEM SELECTOR PLAN 4
c/o of headaches for the last 8-9 months. Neuro exam WNL.  No acute interventions. c/o of headaches for the last 8-9 months. Neuro exam WNL.  fu MRI   No acute interventions.

## 2023-06-28 NOTE — PATIENT PROFILE ADULT - MST SCORE
----- Message from Steven J Heyden, MD sent at 3/3/2020  4:14 PM CST -----  Please contact the patient via their preferred form of communication.  If communication is by letter, please include a flow sheet copy of lab work.    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  This means she should continue to work on exercise to help improve the hip.    Patient would like communication of their results via:    Cell Phone:       Telephone Information:   Mobile [phone]     Okay to leave a message containing results? Yes    Called patient to relay MD message. Patient verbalized understanding and has no further questions at this time.   
1

## 2023-06-28 NOTE — DISCHARGE NOTE PROVIDER - NSDCCPTREATMENT_GEN_ALL_CORE_FT
PRINCIPAL PROCEDURE  Procedure: CT abdomen and pelvis  Findings and Treatment: Hiatal hernia.  Suspected colitis involving the ascending colon and cecum of  infectious   or inflammatory etiology.  No bowel obstruction.  Status post hysterectomy and right oophorectomy.      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  No acute fracture identified in the thoracic and lumbar spine.  Multilevel lumbar spondylitic changes as above most notable at the L4-L5  level where there is mild anterolisthesis, moderate to severe canal   stenosis, mild to moderate bilateral foraminal stenosis and moderate to   severe bilateral lateral recess stenosis..  No significant canal or foraminal stenosis of the thoracic spine.      Procedure: CT lumbar spine  Findings and Treatment: Mild to moderate chronic compression fracture deformity of T12.  No acute fracture identified in the thoracic and lumbar spine.  Multilevel level where there is mild anterolisthesis, moderate to severe canal stenosis, mild to moderate bilateral foraminal stenosis and moderate to   severe bilateral lateral recess stenosis..  No significant canal or foraminal stenosis of the thoracic spine.       PRINCIPAL PROCEDURE  Procedure: CT abdomen and pelvis  Findings and Treatment: Hiatal hernia.  Suspected colitis involving the ascending colon and cecum of  infectious   or inflammatory etiology.  No bowel obstruction.  Status post hysterectomy and right oophorectomy.      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  No acute fracture identified in the thoracic and lumbar spine.  Multilevel lumbar spondylitic changes as above most notable at the L4-L5  level where there is mild anterolisthesis, moderate to severe canal   stenosis, mild to moderate bilateral foraminal stenosis and moderate to   severe bilateral lateral recess stenosis..  No significant canal or foraminal stenosis of the thoracic spine.

## 2023-06-28 NOTE — CHART NOTE - NSCHARTNOTEFT_GEN_A_CORE
PDI	Current Rx	Drug Type	Rx Written	Rx Dispensed	Drug	Quantity	Days Supply	Prescriber Name	Prescriber TIFFANIE #	Payment Method	Dispenser  A	N	O	05/23/2023	05/28/2023	oxycodone-acetaminophen  mg tab	185	26	Mendel Gil ALEXSANDER	[id number]	Medicare	[id number]  A	N	B	05/17/2023	05/17/2023	lorazepam 2 mg tablet	120	30	Federico Whitfield M, MD	[id number]	Medicare	[id number]  A	N	O	04/27/2023	04/28/2023	oxycodone-acetaminophen  mg tab	185	27	Mendel Gil ALEXSANDER	[id number]	Medicare	[id number]  A	N	O	03/29/2023	03/29/2023	oxycodone-acetaminophen  mg tab	185	27	Gil Oglesby ALEXSANDER	[id number]	Medicare	[id number]  A	N	B	03/21/2023	03/22/2023	lorazepam 2 mg tablet	120	30	Federico Whitfield M, MD	[id number]	Medicare	[id number]  A	N	O	02/27/2023	03/02/2023	oxycodone-acetaminophen  mg tab	180	30	Aston Holly	[id number]	Medicare	[id number]  A	N	O	01/23/2023	02/02/2023	oxycodone-acetaminophen  mg tab	180	30	Aston Holly	[id number]	Medicare	[id number]  A	N	B	01/26/2023	01/26/2023	lorazepam 2 mg tablet	120	30	Federico Whitfield M, MD	[id number]	Medicare	[id number]  A	N	O	01/12/2023	01/13/2023	oxycodone-acetaminophen  mg tab	120	30	Aston Holly	[id number]	Medicare	[id number]  A	N	O	12/15/2022	12/15/2022	oxycodone-acetaminophen  mg tab	120	30	Leon Olsen MD	[id number]	Medicare	[id number]  A	N	B	12/01/2022	12/12/2022	lorazepam 2 mg tablet	120	30	Federico Whitfield M, MD	[id number]	Medicare	[id number]  A	N	O	10/27/2022	11/14/2022	oxycodone-acetaminophen  mg tab	195	30	Aston Holly	[id number]	Medicare	[id number]  A	N	B	11/01/2022	11/01/2022	lorazepam 2 mg tablet	120	30	Federico Whitfield M, MD	[id number]	Medicare	[id number]  A	N	O	10/25/2022	10/27/2022	oxycodone-acetaminophen  mg tab	80	20	Federico Whitfield M, MD	[id number]	Medicare	[id number]  A	N	O	09/22/2022	09/28/2022	oxycodone-acetaminophen  mg tab	195	30	Avni Aston	[id number]	Medicare	[id number]  A	N	B	09/19/2022	09/21/2022	lorazepam 2 mg tablet	120	30	Federico Whitfield M, MD	[id number]	Medicare	[id number]  A	N	O	09/14/2022	09/15/2022	oxycodone-acetaminophen  mg tab	80	20	Federico Whitfield M, MD	[id number]	Medicare	[id number]  A	N	O	08/22/2022	08/28/2022	oxycodone-acetaminophen  mg tab	80	20	Federico Whitfield M, MD	[id number]	Medicare	[id number]  A	N	B	08/01/2022	08/02/2022	lorazepam 2 mg tablet	120	30	Federico Whitfield M, MD	[id number]	Medicare	[id number]  A	N	O	07/27/2022	07/29/2022	oxycodone-acetaminophen  mg tab	195	30	Gil Oglesby	[id number]	Medicare	[id number]  A	N	O	06/29/2022	06/30/2022	oxycodone-acetaminophen  mg tab	180	30	Gil Oglesby	[id number]	Medicare	[id number]

## 2023-06-28 NOTE — PHYSICAL THERAPY INITIAL EVALUATION ADULT - REFERRAL TO ANOTHER SERVICE NEEDED, PT EVAL
NAVIGMERI Clinician Contact & Progress Note  For Individual Resiliency Training (IRT)  A Part of the [employer] First Episode of Psychosis Program    NAVIGATE Enrollee: Chapo Beckford (2003)     MRN: [id number]  Date:  7/22/20  Diagnosis: Psychosis, unspecified (F29)  Clinician: MANDI Individual Resiliency Trainer, PATRICIA Peace     writer 
"assisted Chapo PATRICIA Peace Individual Resiliency Trainer  "
occupational therapy
[UNKNOWN]

## 2023-06-28 NOTE — DISCHARGE NOTE PROVIDER - NSCORESITESY/N_GEN_A_CORE_RD
Problem: MOBILITY - ADULT  Goal: Maintain or return to baseline ADL function  Description: INTERVENTIONS:  -  Assess patient's ability to carry out ADLs; assess patient's baseline for ADL function and identify physical deficits which impact ability to perform ADLs (bathing, care of mouth/teeth, toileting, grooming, dressing, etc )  - Assess/evaluate cause of self-care deficits   - Assess range of motion  - Assess patient's mobility; develop plan if impaired  - Assess patient's need for assistive devices and provide as appropriate  - Encourage maximum independence but intervene and supervise when necessary  - Involve family in performance of ADLs  - Assess for home care needs following discharge   - Consider OT consult to assist with ADL evaluation and planning for discharge  - Provide patient education as appropriate  Outcome: Progressing  Goal: Maintains/Returns to pre admission functional level  Description: INTERVENTIONS:  - Perform BMAT or MOVE assessment daily    - Set and communicate daily mobility goal to care team and patient/family/caregiver  - Collaborate with rehabilitation services on mobility goals if consulted  - Perform Range of Motion 2 times a day  - Reposition patient every 2 hours    - Dangle patient 2 times a day  - Stand patient 2 times a day  - Ambulate patient 2 times a day  - Out of bed to chair 2 times a day   - Out of bed for meals 2 times a day  - Out of bed for toileting  - Record patient progress and toleration of activity level   Outcome: Progressing     Problem: PAIN - ADULT  Goal: Verbalizes/displays adequate comfort level or baseline comfort level  Description: Interventions:  - Encourage patient to monitor pain and request assistance  - Assess pain using appropriate pain scale  - Administer analgesics based on type and severity of pain and evaluate response  - Implement non-pharmacological measures as appropriate and evaluate response  - Consider cultural and social influences on pain and pain management  - Notify physician/advanced practitioner if interventions unsuccessful or patient reports new pain  Outcome: Progressing     Problem: INFECTION - ADULT  Goal: Absence or prevention of progression during hospitalization  Description: INTERVENTIONS:  - Assess and monitor for signs and symptoms of infection  - Monitor lab/diagnostic results  - Monitor all insertion sites, i e  indwelling lines, tubes, and drains  - Monitor endotracheal if appropriate and nasal secretions for changes in amount and color  - [place] appropriate cooling/warming therapies per order  - Administer medications as ordered  - Instruct and encourage patient and family to use good hand hygiene technique  - Identify and instruct in appropriate isolation precautions for identified infection/condition  Outcome: Progressing  Goal: Absence of fever/infection during neutropenic period  Description: INTERVENTIONS:  - Monitor WBC    Outcome: Progressing     Problem: SAFETY ADULT  Goal: Maintain or return to baseline ADL function  Description: INTERVENTIONS:  -  Assess patient's ability to carry out ADLs; assess patient's baseline for ADL function and identify physical deficits which impact ability to perform ADLs (bathing, care of mouth/teeth, toileting, grooming, dressing, etc )  - Assess/evaluate cause of self-care deficits   - Assess range of motion  - Assess patient's mobility; develop plan if impaired  - Assess patient's need for assistive devices and provide as appropriate  - Encourage maximum independence but intervene and supervise when necessary  - Involve family in performance of ADLs  - Assess for home care needs following discharge   - Consider OT consult to assist with ADL evaluation and planning for discharge  - Provide patient education as appropriate  Outcome: Progressing  Goal: Maintains/Returns to pre admission functional level  Description: INTERVENTIONS:  - Perform BMAT or MOVE assessment daily    - Set and communicate daily mobility goal to care team and patient/family/caregiver  - Collaborate with rehabilitation services on mobility goals if consulted  - Perform Range of Motion 2 times a day  - Reposition patient every 2 hours    - Dangle patient 2 times a day  - Stand patient 2 times a day  - Ambulate patient 2 times a day  - Out of bed to chair 2 times a day   - Out of bed for meals 2 times a day  - Out of bed for toileting  - Record patient progress and toleration of activity level   Outcome: Progressing  Goal: Patient will remain free of falls  Description: INTERVENTIONS:  - Educate patient/family on patient safety including physical limitations  - Instruct patient to call for assistance with activity   - Consult OT/PT to assist with strengthening/mobility   - Keep Call bell within reach  - Keep bed low and locked with side rails adjusted as appropriate  - Keep care items and personal belongings within reach  - Initiate and maintain comfort rounds  - Make Fall Risk Sign visible to staff  - Offer Toileting every 2 Hours, in advance of need  - Obtain necessary fall risk management equipment:   - Apply yellow socks and bracelet for high fall risk patients  - Consider moving patient to room near nurses station  Outcome: Progressing     Problem: DISCHARGE PLANNING  Goal: Discharge to home or other facility with appropriate resources  Description: INTERVENTIONS:  - Identify barriers to discharge w/patient and caregiver  - Arrange for needed discharge resources and transportation as appropriate  - Identify discharge learning needs (meds, wound care, etc )  - Arrange for interpretive services to assist at discharge as needed  - Refer to Case Management Department for coordinating discharge planning if the patient needs post-hospital services based on physician/advanced practitioner order or complex needs related to functional status, cognitive ability, or social support system  Outcome: Progressing     Problem: Knowledge Deficit  Goal: Patient/family/caregiver demonstrates understanding of disease process, treatment plan, medications, and discharge instructions  Description: Complete learning assessment and assess knowledge base    Interventions:  - Provide teaching at level of understanding  - Provide teaching via preferred learning methods  Outcome: Progressing     Problem: MUSCULOSKELETAL - ADULT  Goal: Maintain or return mobility to safest level of function  Description: INTERVENTIONS:  - Assess patient's ability to carry out ADLs; assess patient's baseline for ADL function and identify physical deficits which impact ability to perform ADLs (bathing, care of mouth/teeth, toileting, grooming, dressing, etc )  - Assess/evaluate cause of self-care deficits   - Assess range of motion  - Assess patient's mobility  - Assess patient's need for assistive devices and provide as appropriate  - Encourage maximum independence but intervene and supervise when necessary  - Involve family in performance of ADLs  - Assess for home care needs following discharge   - Consider OT consult to assist with ADL evaluation and planning for discharge  - Provide patient education as appropriate  Outcome: Progressing  Goal: Maintain proper alignment of affected body part  Description: INTERVENTIONS:  - Support, maintain and protect limb and body alignment  - Provide patient/ family with appropriate education  Outcome: Progressing     Problem: Potential for Falls  Goal: Patient will remain free of falls  Description: INTERVENTIONS:  - Educate patient/family on patient safety including physical limitations  - Instruct patient to call for assistance with activity   - Consult OT/PT to assist with strengthening/mobility   - Keep Call bell within reach  - Keep bed low and locked with side rails adjusted as appropriate  - Keep care items and personal belongings within reach  - Initiate and maintain comfort rounds  - Make Fall Risk Sign visible to staff  - Offer Toileting every 2 Hours, in advance of need  - Obtain necessary fall risk management equipment:   - Apply yellow socks and bracelet for high fall risk patients  - Consider moving patient to room near nurses station  Outcome: Progressing     Problem: Prexisting or High Potential for Compromised Skin Integrity  Goal: Skin integrity is maintained or improved  Description: INTERVENTIONS:  - Identify patients at risk for skin breakdown  - Assess and monitor skin integrity  - Assess and monitor nutrition and hydration status  - Monitor labs   - Assess for incontinence   - Turn and reposition patient  - Assist with mobility/ambulation  - Relieve pressure over bony prominences  - Avoid friction and shearing  - Provide appropriate hygiene as needed including keeping skin clean and dry  - Evaluate need for skin moisturizer/barrier cream  - Collaborate with interdisciplinary team   - Patient/family teaching  - Consider wound care consult   Outcome: Progressing No

## 2023-06-28 NOTE — DISCHARGE NOTE PROVIDER - NSDCMRMEDTOKEN_GEN_ALL_CORE_FT
hydrALAZINE 10 mg oral tablet: 1 orally 3 times a day  levothyroxine 75 mcg (0.075 mg) oral tablet: 1 tab(s) orally once a day  losartan 50 mg oral tablet: 1 orally once a day  oxycodone-acetaminophen 10 mg-325 mg oral tablet: 1 orally every 6 hours as needed for  moderate pain  Plendil: 5 milligram(s) orally 2 times a day  Protonix 40 mg oral delayed release tablet: 1 orally once a day  simvastatin 40 mg oral tablet: 1 orally once a day (at bedtime)  Toprol-XL 50 mg oral tablet, extended release: 1 tab(s) orally once a day   amLODIPine 2.5 mg oral tablet: 1 tab(s) orally every 24 hours  hydrALAZINE 10 mg oral tablet: 1 tab(s) orally 3 times a day  levothyroxine 75 mcg (0.075 mg) oral tablet: 1 tab(s) orally once a day  losartan 50 mg oral tablet: 1 orally once a day  metoclopramide 5 mg oral tablet: 1 tab(s) orally 2 times a day for delayed gastric emptying.  oxycodone-acetaminophen 10 mg-325 mg oral tablet: 1 orally every 6 hours as needed for  moderate pain  Plendil: 5 milligram(s) orally 2 times a day  Protonix 40 mg oral delayed release tablet: 1 orally once a day  simvastatin 40 mg oral tablet: 1 orally once a day (at bedtime)  Toprol-XL 50 mg oral tablet, extended release: 1 tab(s) orally once a day   amLODIPine 2.5 mg oral tablet: 1 tab(s) orally every 24 hours  ferrous sulfate 324 mg (65 mg elemental iron) oral delayed release tablet: 1 tab(s) orally once a day  hydrALAZINE 10 mg oral tablet: 1 tab(s) orally 3 times a day  levothyroxine 75 mcg (0.075 mg) oral tablet: 1 tab(s) orally once a day  losartan 50 mg oral tablet: 1 orally once a day  metoclopramide 5 mg oral tablet: 1 tab(s) orally 2 times a day for delayed gastric emptying.  oxycodone-acetaminophen 10 mg-325 mg oral tablet: 1 orally every 6 hours as needed for  moderate pain  Plendil: 5 milligram(s) orally 2 times a day  Protonix 40 mg oral delayed release tablet: 1 orally once a day  simvastatin 40 mg oral tablet: 1 orally once a day (at bedtime)  Toprol-XL 50 mg oral tablet, extended release: 1 tab(s) orally once a day

## 2023-06-28 NOTE — PROGRESS NOTE ADULT - PROBLEM SELECTOR PLAN 1
Ongoing for months. Has been taking percocets at home. Neuro exam WNL. Ambulating around room comfortably. CT A&P showing L4-L5 canal stenosis, ddx also includes compression fracture.  - iSTOP  - c/w percocet and bowel regimen  - CT thoracic and lumbar spine  - PT eval

## 2023-06-28 NOTE — CONSULT NOTE ADULT - ASSESSMENT
{\rtf1\ugimys09012\ansi\uhpgqzr3644\ftnbj\uc1\deff0  {\fonttbl{\f0 \fnil Segoe UI;}{\f1 \fnil \fcharset0 Segoe UI;}{\f2 \fnil Times New Kevin;}}  {\colortbl ;\odf352\nnerd115\vbvu992 ;\red0\green0\blue0 ;\red0\green0\gcwv371 ;\red0\green0\blue0 ;}  {\stylesheet{\f0\fs20 Normal;}{\cs1 Default Paragraph Font;}{\cs2\f0\fs16 Line Number;}{\cs3\f2\fs24\ul\cf3 Hyperlink;}}  {\*\revtbl{Unknown;}}  \xnoeji04563\uisdxc44275\fapkc4456\hbyob4641\bgpdo2993\jjitd8015\ccjrdib535\egrcqgw760\nogrowautofit\ahnhdv426\formshade\nofeaturethrottle1\dntblnsbdb\fet4\aendnotes\aftnnrlc\pgbrdrhead\pgbrdrfoot  \sectd\jttvfn94477\fqewhh85205\guttersxn0\nnemjedr0122\oasiouut5992\xrbhfbtl1767\jqpcvuav2045\tyxllpe112\knpgkuk552\sbkpage\pgncont\pgndec  \plain\plain\f0\fs24\ql\plain\f0\fs24\plain\f0\fs20\qlkf0788\hich\f0\dbch\f0\loch\f0\fs20 I M\par  \par  89 y o F poor historian PMH HTN, meningioma, Crohn's, hypothyroidism presents for back pain ongoing for a week and abdominal pain going on for months. Admitted for pain control.\par  \par  \plain\f1\fs20\kjnx0911\hich\f1\dbch\f1\loch\f1\cf2\fs20\ul{\field{\*\fldinst HYPERLINK [id number] }{\fldrslt Problem/Plan - 1:}}\plain\f0\fs20\ypxu9595\hich\f0\dbch\f0\loch\f0\fs20\ql\par  \'b7  {\*\bkmkstart hh29785234537}{\*\bkmkend cz47928949211}Problem: {\*\bkmkstart nb65164849747}{\*\bkmkend cw37908962974}Back pain. \par  \'b7  {\*\bkmkstart iq14533748461}{\*\bkmkend gd17238239315}Plan: {\*\bkmkstart sz74094603635}{\*\bkmkend wc11332714056}Ongoing for months. Has been taking percocets at home. Neuro exam WNL. Ambulating around room comfortably. CT A&P showing L4-L5 canal   stenosis, ddx also includes compression fracture.\par  - iSTOP\par  - c/w percocet and bowel regimen\par  - CT thoracic and lumbar spine\par  - PT eval.\plain\f1\fs20\wvgk5979\hich\f1\dbch\f1\loch\f1\cf2\fs20\strike\plain\f0\fs20\lirr5034\hich\f0\dbch\f0\loch\f0\fs20\par  \par  \plain\f1\fs20\upas8034\hich\f1\dbch\f1\loch\f1\cf2\fs20\ul{\field{\*\fldinst HYPERLINK [id number] }{\fldrslt Problem/Plan - 2:}}\plain\f0\fs20\xuwt9889\hich\f0\dbch\f0\loch\f0\fs20\ql\par  \'b7  {\*\bkmkstart kl68041815967}{\*\bkmkend vy63683118816}Problem: {\*\bkmkstart lq71160097102}{\*\bkmkend rt27199216317}H/O abdominal pain. \par  \'b7  {\*\bkmkstart zc32048904506}{\*\bkmkend ax27424618049}Plan: {\*\bkmkstart wm23096831011}{\*\bkmkend rm05506761586}Has hx of crohn's dz. Not following with GI. Pain has been present for months. No signs of infection. Having bms. Pain does not appear   related to eating. On exam no cervantes's sign, pain appears diffuse. CT with signs of colitis but do not clinically suspect colitis. CT also showing biliary dilation however LFTs/juan wnl. neg murphys. \par  - pain control as above\par  - antiemetics as needed\par  - c/w home PPI\par  - consider GI pcr if diarrhea develops \par  - serial exams.\par  \par  \plain\f1\fs20\epqb9801\hich\f1\dbch\f1\loch\f1\cf2\fs20\ul{\field{\*\fldinst HYPERLINK [id number] }{\fldrslt Problem/Plan - 3:}}\plain\f0\fs20\eqbh4041\hich\f0\dbch\f0\loch\f0\fs20\ql\par  \'b7  {\*\bkmkstart cs32594368733}{\*\bkmkend mq96270259952}Problem: {\*\bkmkstart yk72180837091}{\*\bkmkend on68651335551}Anemia. \par  \'b7  {\*\bkmkstart mb31110934136}{\*\bkmkend jl16475915364}Plan: {\*\bkmkstart aj60193895749}{\*\bkmkend nn53306368400}new anemia compared to labs from 2017\par  - f/u iron studies, B12, folate.\par  \par  \plain\f1\fs20\fptb4627\hich\f1\dbch\f1\loch\f1\cf2\fs20\ul{\field{\*\fldinst HYPERLINK [id number] }{\fldrslt Problem/Plan - 4:}}\plain\f0\fs20\zmqk7698\hich\f0\dbch\f0\loch\f0\fs20\ql\par  \'b7  {\*\bkmkstart jz32875684739}{\*\bkmkend qv84858349108}Problem: {\*\bkmkstart dl98922334449}{\*\bkmkend at72576771769}Meningioma. \par  \'b7  {\*\bkmkstart eu66358710197}{\*\bkmkend zq36937640145}Plan: {\*\bkmkstart uj31920455330}{\*\bkmkend jk62191465182}c/o of headaches for the last 8-9 months. Neuro exam WNL.\par  \par  \plain\f1\fs20\bxpf6301\hich\f1\dbch\f1\loch\f1\cf2\fs20\ul{\field{\*\fldinst HYPERLINK [id number] }{\fldrslt Problem/Plan - 5:}}\plain\f0\fs20\gvkr4211\hich\f0\dbch\f0\loch\f0\fs20\ql\par  \'b7  {\*\bkmkstart ci21375686054}{\*\bkmkend ug31053491046}Problem: {\*\bkmkstart fd77054185885}{\*\bkmkend so66969515360}Hypertension. \par  \'b7  {\*\bkmkstart wh74728348907}{\*\bkmkend fw13535334506}Plan: {\*\bkmkstart tw57841461022}{\*\bkmkend aa19124807163}- c/w losartan 50mg, toprol 50mg, amlodipine 2.5mg, hydral 10mg TID\par  - also taking felodipine 5mg BID > would dc and increase amlodipine if needed.\par  \par  \plain\f1\fs20\daam8543\hich\f1\dbch\f1\loch\f1\cf2\fs20\ul{\field{\*\fldinst HYPERLINK [id number] }{\fldrslt Problem/Plan - 6:}}\plain\f0\fs20\jlfa3158\hich\f0\dbch\f0\loch\f0\fs20\ql\par  \'b7  {\*\bkmkstart vy74269504760}{\*\bkmkend ks38754873091}Problem: {\*\bkmkstart wx13379448118}{\*\bkmkend mh88848795377}HLD (hyperlipidemia). \par  \'b7  {\*\bkmkstart ja54395071915}{\*\bkmkend my34732677507}Plan: {\*\bkmkstart xh87748243586}{\*\bkmkend jf81523421329}- c/w home simvastatin 40mg qD\par  \par  #Hypothyroidism\par  - c/w home synthroid 75mcg.\par  \par  \plain\f1\fs20\rzjg7354\hich\f1\dbch\f1\loch\f1\cf2\fs20\ul{\field{\*\fldinst HYPERLINK [id number] }{\fldrslt Problem/Plan - 7:}}\plain\f0\fs20\golc7897\hich\f0\dbch\f0\loch\f0\fs20\ql\par  \'b7  {\*\bkmkstart cd87253017059}{\*\bkmkend wp40387822597}Problem: {\*\bkmkstart wx14456317896}{\*\bkmkend gx60836746385}History of benzodiazepine use. \par  \'b7  {\*\bkmkstart ru04550267295}{\*\bkmkend vs29208007399}Plan: {\*\bkmkstart au28574394338}{\*\bkmkend yi95142164339}ativan 2mg present in surescripts\par  - iSTOP\par  - c/w ativan 2 mg prn qhs > outpatient taper.\par  \par  \plain\f1\fs20\kkri1461\hich\f1\dbch\f1\loch\f1\cf2\fs20\ul{\field{\*\fldinst HYPERLINK [id number] }{\fldrslt Problem/Plan - 8:}}\plain\f0\fs20\gakq0460\hich\f0\dbch\f0\loch\f0\fs20\ql\par  \'b7  {\*\bkmkstart xi22801137950}{\*\bkmkend vz11817014363}Problem: {\*\bkmkstart ih59979618182}{\*\bkmkend wc46858263811}Prophylactic measure. \par  \'b7  {\*\bkmkstart rs03981039067}{\*\bkmkend uf66190192576}Plan: {\*\bkmkstart cf04403467051}{\*\bkmkend qx19995170321}F: PO\par  E: replete PRN\par  N: dash\par  DVT ppx: lovenox\par  GI PPx: PPI\par  \par  FULL CODE\par  \par  Dispo: RMF.\par  \par  \par  }

## 2023-06-28 NOTE — PHYSICAL THERAPY INITIAL EVALUATION ADULT - PERTINENT HX OF CURRENT PROBLEM, REHAB EVAL
89F poor historian PMH HTN, meningioma, Crohn's, hypothyroidism presents for back pain ongoing for months that has worsened in the last week. Pain is located in the lumbar area and does not radiate. She ambulates with a cane, denies recent fall. She has been taking percocets for pain. Also c/o abdominal pain for the past 2 months and nausea as well as headache for the last 8-9 months. No fevers, chills, diarrhea. Has not seen GI recently. Has not followed up with anyone for meningioma. Has HHA. Was sent in for admission by Dr. Neely.

## 2023-06-28 NOTE — PHYSICAL THERAPY INITIAL EVALUATION ADULT - ADDITIONAL COMMENTS
Pt lives alone an elevator access apartment with a 24/7 A services. Pt reports to be able to perform ADLs and IADLs. Pt ambulates with SC at baseline.

## 2023-06-28 NOTE — PROGRESS NOTE ADULT - PROBLEM SELECTOR PLAN 3
new anemia compared to labs from 2017  - f/u iron studies, B12, folate new anemia compared to labs from 2017  - iron studies  outpt w REYES   will give iv iron 300 x 3 days

## 2023-06-28 NOTE — PROGRESS NOTE ADULT - PROBLEM SELECTOR PLAN 2
Has hx of crohn's dz. Not following with GI. Pain has been present for months. No signs of infection. Having bms. Pain does not appear related to eating. On exam no cervantes's sign, pain appears diffuse. CT with signs of colitis but do not clinically suspect colitis. CT also showing biliary dilation however LFTs/juan wnl. neg murphys.   - pain control as above  - antiemetics as needed  - c/w home PPI  - consider GI pcr if diarrhea develops   - serial exams Has hx of crohn's dz. Not following with GI. Pain has been present for months. No signs of infection. Having bms. Pain does not appear related to eating. On exam no cervantes's sign, pain appears diffuse. CT with signs of colitis but do not clinically suspect colitis. CT also showing biliary dilation however LFTs/juan wnl. neg murphys.   - pain control as above  - antiemetics as needed  - c/w home PPI  - consider GI pcr if diarrhea develops   - pt states she did not tolerate crohns meds in the past an d has been off of meds   - serial exams

## 2023-06-28 NOTE — DISCHARGE NOTE PROVIDER - NSDCCPCAREPLAN_GEN_ALL_CORE_FT
PRINCIPAL DISCHARGE DIAGNOSIS  Diagnosis: Crohn's colitis  Assessment and Plan of Treatment:       SECONDARY DISCHARGE DIAGNOSES  Diagnosis: Abdominal pain  Assessment and Plan of Treatment:      PRINCIPAL DISCHARGE DIAGNOSIS  Diagnosis: Crohn's colitis  Assessment and Plan of Treatment: You have a known diagnosis of Crohn's disease and you were admitted to the hospital for having abdominal pain. We contiunued your Percocet to help control the pain as well as gave bowel regimen to help with bowel movement.   CT abdomen showed suspected colitis involving the ascending colon and cecum of  infectious or inflammatory etiology. No bowel obstruction. Given your history of side effects from medications for Crohn's, you wished to not take any medication for thayt condition. We also gave IV iron because your labs showed low iron.   You wished to be discharged without further work up and wanted to follow up outpatient with a gastroenterologist.   Continue taking protonix 40 mg two times a day at home.   Crohn's disease is characterized by transmural inflammation and may involve any portion of gastrointestinal tract, from the oral cavity to the perianal area. The symptoms of Crohn's include crampy abdominal pain, chronic intermittent diarrhea (with or without gross bleeding), fatigue, and weight loss. The duration of symptoms suggestive of CD is variable. Some patients may present years after the onset of symptoms, while others may present acutely.      SECONDARY DISCHARGE DIAGNOSES  Diagnosis: Back pain  Assessment and Plan of Treatment: You were admitted for back pain. We did a CT of the thracic and lumbar spine spine which showed Mild to moderate chronic compression fracture deformity of T12. No acute fracture identified in the thoracic and lumbar spine.  Multilevel lumbar spondylitic changes as above most notable at the L4-L5  level where there is mild anterolisthesis, moderate to severe canal  stenosis, mild to moderate bilateral foraminal stenosis and moderate to   severe bilateral lateral recess stenosis..  No significant canal or foraminal stenosis of the thoracic spine.  We had physical therapy evaluate you.   Goal(s)	To get better and follow your care plan as instructed.     PRINCIPAL DISCHARGE DIAGNOSIS  Diagnosis: Crohn's colitis  Assessment and Plan of Treatment: You have a known diagnosis of Crohn's disease and you were admitted to the hospital for having abdominal pain. We contiunued your Percocet to help control the pain as well as gave bowel regimen to help with bowel movement.   CT abdomen showed suspected colitis involving the ascending colon and cecum of  infectious or inflammatory etiology. No bowel obstruction. Given your history of side effects from medications for Crohn's, you wished to not take any medication for thayt condition. We also gave IV iron because your labs showed low iron.   You wished to be discharged without further work up and wanted to follow up outpatient with a gastroenterologist.   Continue taking protonix 40 mg two times a day at home.   Crohn's disease is characterized by transmural inflammation and may involve any portion of gastrointestinal tract, from the oral cavity to the perianal area. The symptoms of Crohn's include crampy abdominal pain, chronic intermittent diarrhea (with or without gross bleeding), fatigue, and weight loss. The duration of symptoms suggestive of CD is variable. Some patients may present years after the onset of symptoms, while others may present acutely.      SECONDARY DISCHARGE DIAGNOSES  Diagnosis: Back pain  Assessment and Plan of Treatment: You were admitted for back pain. We did a CT of the thracic and lumbar spine spine which showed Mild to moderate chronic compression fracture deformity of T12. No acute fracture identified in the thoracic and lumbar spine.  Multilevel lumbar spondylitic changes as above most notable at the L4-L5  level where there is mild anterolisthesis, moderate to severe canal  stenosis, mild to moderate bilateral foraminal stenosis and moderate to   severe bilateral lateral recess stenosis..  No significant canal or foraminal stenosis of the thoracic spine.  We had physical therapy evaluate you and was advised home PT.    Goal(s)To get better and follow your care plan as instructed.    Diagnosis: Skin lesion  Assessment and Plan of Treatment: You had a lesion on your right big toe. Podiatry was consulted and evaluated for toe. Excisional debridement of the pre-ulcerative lesions were done, toe nails were cleaned and educated you on proper foot care.        PRINCIPAL DISCHARGE DIAGNOSIS  Diagnosis: Back pain  Assessment and Plan of Treatment: You were admitted for back pain. We did a CT of the thracic and lumbar spine spine which showed Mild to moderate chronic compression fracture deformity of T12. No acute fracture identified in the thoracic and lumbar spine.  Multilevel lumbar spondylitic changes as above most notable at the L4-L5  level where there is mild anterolisthesis, moderate to severe canal  stenosis, mild to moderate bilateral foraminal stenosis and moderate to   severe bilateral lateral recess stenosis..  No significant canal or foraminal stenosis of the thoracic spine.  We had physical therapy evaluate you and was advised home PT.    Goal(s)To get better and follow your care plan as instructed.      SECONDARY DISCHARGE DIAGNOSES  Diagnosis: Skin lesion  Assessment and Plan of Treatment: You had a lesion on your right big toe. Podiatry was consulted and evaluated for toe. Excisional debridement of the pre-ulcerative lesions were done, toe nails were cleaned and educated you on proper foot care.       Diagnosis: Crohn's disease  Assessment and Plan of Treatment: On CT scan there was concern for colitis likely inflammatory secondary to your chronic crohns disease. patient states she has not been on medication due to intolerance. Please call stomach doctor gasteroenterologist for evaluation and further work up if you are still interested in [facility] and treatment    Diagnosis: REYES (iron deficiency anemia)  Assessment and Plan of Treatment: You were given iron in the hospital and will be discharged on oral iron. Please Follow up with your doctor to repeat blood work. you might

## 2023-06-28 NOTE — DISCHARGE NOTE PROVIDER - NSDCFUADDAPPT_GEN_ALL_CORE_FT
Please follow up with  and schedule a follow up appointment in 2 weeks.         Appointment set for July 6th 2023 at 2:30 pm with .         Scheduled Appointment: 07/06/2023     Additional Scheduled Appointments	  Please bring your Insurance card, Photo ID and Discharge paperwork to the following appointment:    (1) Please follow up with your Gastroenterologist Provider,  at [address] [place] [address], [address] [place] [address], at 2:30 pm

## 2023-06-29 ENCOUNTER — TRANSCRIPTION ENCOUNTER (OUTPATIENT)
Age: 88
End: 2023-06-29

## 2023-06-29 VITALS — DIASTOLIC BLOOD PRESSURE: 60 MMHG | SYSTOLIC BLOOD PRESSURE: 160 MMHG

## 2023-06-29 DIAGNOSIS — R21 RASH AND OTHER NONSPECIFIC SKIN ERUPTION: ICD-10-CM

## 2023-06-29 LAB
ALBUMIN SERPL ELPH-MCNC: 3.4 G/DL — SIGNIFICANT CHANGE UP (ref 3.3–5)
ALP SERPL-CCNC: 55 U/L — SIGNIFICANT CHANGE UP (ref 40–120)
ALT FLD-CCNC: 8 U/L — LOW (ref 10–45)
ANION GAP SERPL CALC-SCNC: 12 MMOL/L — SIGNIFICANT CHANGE UP (ref 5–17)
AST SERPL-CCNC: 12 U/L — SIGNIFICANT CHANGE UP (ref 10–40)
BASOPHILS # BLD AUTO: 0.03 K/UL — SIGNIFICANT CHANGE UP (ref 0–0.2)
BASOPHILS NFR BLD AUTO: 0.9 % — SIGNIFICANT CHANGE UP (ref 0–2)
BILIRUB SERPL-MCNC: 0.2 MG/DL — SIGNIFICANT CHANGE UP (ref 0.2–1.2)
BUN SERPL-MCNC: 15 MG/DL — SIGNIFICANT CHANGE UP (ref 7–23)
CALCIUM SERPL-MCNC: 8.4 MG/DL — SIGNIFICANT CHANGE UP (ref 8.4–10.5)
CHLORIDE SERPL-SCNC: 107 MMOL/L — SIGNIFICANT CHANGE UP (ref 96–108)
CO2 SERPL-SCNC: 20 MMOL/L — LOW (ref 22–31)
CREAT SERPL-MCNC: 0.96 MG/DL — SIGNIFICANT CHANGE UP (ref 0.5–1.3)
CRP SERPL-MCNC: <3 MG/L — SIGNIFICANT CHANGE UP (ref 0–4)
EGFR: 55 ML/MIN/1.73M2 — LOW
EOSINOPHIL # BLD AUTO: 0.17 K/UL — SIGNIFICANT CHANGE UP (ref 0–0.5)
EOSINOPHIL NFR BLD AUTO: 4.9 % — SIGNIFICANT CHANGE UP (ref 0–6)
ERYTHROCYTE [SEDIMENTATION RATE] IN BLOOD: 9 MM/HR — SIGNIFICANT CHANGE UP
GLUCOSE SERPL-MCNC: 154 MG/DL — HIGH (ref 70–99)
HCT VFR BLD CALC: 30.1 % — LOW (ref 34.5–45)
HGB BLD-MCNC: 9.2 G/DL — LOW (ref 11.5–15.5)
IMM GRANULOCYTES NFR BLD AUTO: 0.6 % — SIGNIFICANT CHANGE UP (ref 0–0.9)
LYMPHOCYTES # BLD AUTO: 1.17 K/UL — SIGNIFICANT CHANGE UP (ref 1–3.3)
LYMPHOCYTES # BLD AUTO: 34 % — SIGNIFICANT CHANGE UP (ref 13–44)
MAGNESIUM SERPL-MCNC: 2.9 MG/DL — HIGH (ref 1.6–2.6)
MCHC RBC-ENTMCNC: 26.9 PG — LOW (ref 27–34)
MCHC RBC-ENTMCNC: 30.6 GM/DL — LOW (ref 32–36)
MCV RBC AUTO: 88 FL — SIGNIFICANT CHANGE UP (ref 80–100)
MONOCYTES # BLD AUTO: 0.46 K/UL — SIGNIFICANT CHANGE UP (ref 0–0.9)
MONOCYTES NFR BLD AUTO: 13.4 % — SIGNIFICANT CHANGE UP (ref 2–14)
NEUTROPHILS # BLD AUTO: 1.59 K/UL — LOW (ref 1.8–7.4)
NEUTROPHILS NFR BLD AUTO: 46.2 % — SIGNIFICANT CHANGE UP (ref 43–77)
NRBC # BLD: 0 /100 WBCS — SIGNIFICANT CHANGE UP (ref 0–0)
PHOSPHATE SERPL-MCNC: 3.6 MG/DL — SIGNIFICANT CHANGE UP (ref 2.5–4.5)
PLATELET # BLD AUTO: 123 K/UL — LOW (ref 150–400)
POTASSIUM SERPL-MCNC: 3.8 MMOL/L — SIGNIFICANT CHANGE UP (ref 3.5–5.3)
POTASSIUM SERPL-SCNC: 3.8 MMOL/L — SIGNIFICANT CHANGE UP (ref 3.5–5.3)
PROT SERPL-MCNC: 6.1 G/DL — SIGNIFICANT CHANGE UP (ref 6–8.3)
RBC # BLD: 3.42 M/UL — LOW (ref 3.8–5.2)
RBC # FLD: 16 % — HIGH (ref 10.3–14.5)
SODIUM SERPL-SCNC: 139 MMOL/L — SIGNIFICANT CHANGE UP (ref 135–145)
WBC # BLD: 3.44 K/UL — LOW (ref 3.8–10.5)
WBC # FLD AUTO: 3.44 K/UL — LOW (ref 3.8–10.5)

## 2023-06-29 PROCEDURE — 83690 ASSAY OF LIPASE: CPT

## 2023-06-29 PROCEDURE — 96375 TX/PRO/DX INJ NEW DRUG ADDON: CPT

## 2023-06-29 PROCEDURE — 73630 X-RAY EXAM OF FOOT: CPT

## 2023-06-29 PROCEDURE — 73630 X-RAY EXAM OF FOOT: CPT | Mod: 26,RT

## 2023-06-29 PROCEDURE — G1004: CPT

## 2023-06-29 PROCEDURE — 80053 COMPREHEN METABOLIC PANEL: CPT

## 2023-06-29 PROCEDURE — 80076 HEPATIC FUNCTION PANEL: CPT

## 2023-06-29 PROCEDURE — 86140 C-REACTIVE PROTEIN: CPT

## 2023-06-29 PROCEDURE — 36415 COLL VENOUS BLD VENIPUNCTURE: CPT

## 2023-06-29 PROCEDURE — 93005 ELECTROCARDIOGRAM TRACING: CPT

## 2023-06-29 PROCEDURE — 99285 EMERGENCY DEPT VISIT HI MDM: CPT

## 2023-06-29 PROCEDURE — 99239 HOSP IP/OBS DSCHRG MGMT >30: CPT

## 2023-06-29 PROCEDURE — 96374 THER/PROPH/DIAG INJ IV PUSH: CPT

## 2023-06-29 PROCEDURE — 74177 CT ABD & PELVIS W/CONTRAST: CPT | Mod: MG

## 2023-06-29 PROCEDURE — 81001 URINALYSIS AUTO W/SCOPE: CPT

## 2023-06-29 PROCEDURE — 84100 ASSAY OF PHOSPHORUS: CPT

## 2023-06-29 PROCEDURE — 97161 PT EVAL LOW COMPLEX 20 MIN: CPT

## 2023-06-29 PROCEDURE — 80048 BASIC METABOLIC PNL TOTAL CA: CPT

## 2023-06-29 PROCEDURE — 85652 RBC SED RATE AUTOMATED: CPT

## 2023-06-29 PROCEDURE — 72128 CT CHEST SPINE W/O DYE: CPT

## 2023-06-29 PROCEDURE — 72131 CT LUMBAR SPINE W/O DYE: CPT

## 2023-06-29 PROCEDURE — 83735 ASSAY OF MAGNESIUM: CPT

## 2023-06-29 PROCEDURE — 85025 COMPLETE CBC W/AUTO DIFF WBC: CPT

## 2023-06-29 RX ORDER — HYDRALAZINE HCL 50 MG
1 TABLET ORAL
Refills: 0 | DISCHARGE

## 2023-06-29 RX ORDER — HYDRALAZINE HCL 50 MG
1 TABLET ORAL
Qty: 0 | Refills: 0 | DISCHARGE
Start: 2023-06-29

## 2023-06-29 RX ORDER — AMLODIPINE BESYLATE 2.5 MG/1
1 TABLET ORAL
Qty: 0 | Refills: 0 | DISCHARGE
Start: 2023-06-29

## 2023-06-29 RX ORDER — METOCLOPRAMIDE HCL 10 MG
1 TABLET ORAL
Qty: 30 | Refills: 0
Start: 2023-06-29 | End: 2023-07-13

## 2023-06-29 RX ORDER — DIPHENHYDRAMINE HCL 50 MG
25 CAPSULE ORAL ONCE
Refills: 0 | Status: COMPLETED | OUTPATIENT
Start: 2023-06-29 | End: 2023-06-29

## 2023-06-29 RX ORDER — FERROUS SULFATE 325(65) MG
1 TABLET ORAL
Qty: 30 | Refills: 0
Start: 2023-06-29 | End: 2023-07-28

## 2023-06-29 RX ORDER — MAGNESIUM SULFATE 500 MG/ML
1 VIAL (ML) INJECTION ONCE
Refills: 0 | Status: COMPLETED | OUTPATIENT
Start: 2023-06-29 | End: 2023-06-29

## 2023-06-29 RX ADMIN — Medication 10 MILLIGRAM(S): at 16:12

## 2023-06-29 RX ADMIN — Medication 25 MILLIGRAM(S): at 12:58

## 2023-06-29 RX ADMIN — LOSARTAN POTASSIUM 50 MILLIGRAM(S): 100 TABLET, FILM COATED ORAL at 06:50

## 2023-06-29 RX ADMIN — PANTOPRAZOLE SODIUM 40 MILLIGRAM(S): 20 TABLET, DELAYED RELEASE ORAL at 06:50

## 2023-06-29 RX ADMIN — Medication 50 MILLIGRAM(S): at 12:58

## 2023-06-29 RX ADMIN — Medication 100 GRAM(S): at 10:43

## 2023-06-29 RX ADMIN — Medication 88 MICROGRAM(S): at 06:50

## 2023-06-29 RX ADMIN — Medication 3 MILLIGRAM(S): at 01:59

## 2023-06-29 RX ADMIN — Medication 10 MILLIGRAM(S): at 06:50

## 2023-06-29 NOTE — DISCHARGE NOTE NURSING/CASE MANAGEMENT/SOCIAL WORK - PATIENT PORTAL LINK FT
You can access the FollowMyHealth Patient Portal offered by [facility] by registering at the following website: [email or web address]. By joining [employer]’s FollowMyHealth portal, you will also be able to view your health information using other applications (apps) compatible with our system.

## 2023-06-29 NOTE — PROGRESS NOTE ADULT - PROBLEM SELECTOR PLAN 2
Has hx of crohn's dz. Not following with GI. Pain has been present for months. No signs of infection. Having bms. Pain does not appear related to eating. On exam no cervantes's sign, pain appears diffuse. CT with signs of colitis but do not clinically suspect colitis. CT also showing biliary dilation however LFTs/juan wnl. neg murphys.   - pain control as above  - antiemetics as needed  - c/w home PPI  - consider GI pcr if diarrhea develops   - pt states she did not tolerate crohns meds in the past and refused medical management for it.   - serial exams Has hx of crohn's dz. Not following with GI. Pain has been present for months. No signs of infection. Having bms. Pain does not appear related to eating. On exam no cervantes's sign, pain appears diffuse. CT with signs of colitis but do not clinically suspect colitis. CT also showing biliary dilation however LFTs/juan wnl. neg murphys.   - pain control as above  - antiemetics as needed  - c/w home PPI  - consider GI pcr if diarrhea develops   - pt states she did not tolerate crohns meds in the past and refused medical management for it.

## 2023-06-29 NOTE — PROGRESS NOTE ADULT - PROBLEM SELECTOR PLAN 3
new anemia compared to labs from 2017  - iron studies  outpt w REYES   will give iv iron 300 x 3 days new anemia compared to labs from 2017  - iron studies outpt w REYES   will give iv iron 300 x while inpt   dc on oral iron replacement

## 2023-06-29 NOTE — CONSULT NOTE ADULT - SUBJECTIVE AND OBJECTIVE BOX
Attending: Dr. Garcia     Patient is a 89y old  Female who presents with a chief complaint of back pain, abdominal pain (29 Jun 2023 06:29)      HPI:  89F poor historian PMH HTN, meningioma, Crohn's, hypothyroidism presents for back pain ongoing for months that has worsened in the last week. Pain is located in the lumbar area and does not radiate. She ambulates with a cane, denies recent fall. She has been taking percocets for pain. Also c/o abdominal pain for the past 2 months and nausea as well as headache for the last 8-9 months. No fevers, chills, diarrhea. Has not seen GI recently. Has not followed up with anyone for meningioma. Has HHA. Was sent in for admission by Dr. Neely.  Podiatry addendum: 89 y/o F pmh htn, meningioma, Crohn's, and hypothyroidism admitted for generalized pain. Podiatry consulted to evaluate R toe lesion. Pt complains that her R toe is infected. States the toe is very sore. States this has been going on for years and comes and goes. Follows up regularly with her podiatrist (Dr. Licona) who usually just cuts her toenails and debrides her lesions. Denies any drainage or malodor from her toe.     Review of systems negative except per HPI and as stated below  General:	 no weakness; no fevers, no chills  Skin/Breast: no rash  Respiratory and Thorax: no SOB, no cough  Cardiovascular:	No chest pain  Gastrointestinal:	 no nausea, vomiting , diarrhea  Genitourinary:	no dysuria, no difficulty urinating, no hematuria  Musculoskeletal:	no weakness, no joint swelling/pain  Neurological:	no focal weakness/numbness  Endocrine:	no polyuria, no polydipsia    PAST MEDICAL & SURGICAL HISTORY:  HTN (hypertension)  Brain tumor  Other specified hypothyroidism  History of partial hysterectomy  History of appendectomy        Home Medications:  hydrALAZINE 10 mg oral tablet: 1 orally 3 times a day (27 Jun 2023 23:49)  levothyroxine 75 mcg (0.075 mg) oral tablet: 1 tab(s) orally once a day (27 Jun 2023 23:53)  losartan 50 mg oral tablet: 1 orally once a day (27 Jun 2023 23:53)  oxycodone-acetaminophen 10 mg-325 mg oral tablet: 1 orally every 6 hours as needed for  moderate pain (27 Jun 2023 23:53)  Plendil: 5 milligram(s) orally 2 times a day (27 Jun 2023 23:53)  Protonix 40 mg oral delayed release tablet: 1 orally once a day (27 Jun 2023 23:53)  simvastatin 40 mg oral tablet: 1 orally once a day (at bedtime) (27 Jun 2023 23:50)  Toprol-XL 50 mg oral tablet, extended release: 1 tab(s) orally once a day (27 Jun 2023 23:53)    Allergies    No Known Allergies    Intolerances      FAMILY HISTORY:  Family history of cancer (Sibling)      Social History:       LABS                        9.2    3.44  )-----------( 123      ( 29 Jun 2023 12:00 )             30.1     06-29    139  |  107  |  15  ----------------------------<  154<H>  3.8   |  20<L>  |  0.96    Ca    8.4      29 Jun 2023 12:00  Phos  3.6     06-29  Mg     2.9     06-29    TPro  6.1  /  Alb  3.4  /  TBili  0.2  /  DBili  x   /  AST  12  /  ALT  8<L>  /  AlkPhos  55  06-29      ESR: 9  CRP: --  06-29 @ 12:00    Vital Signs Last 24 Hrs  T(C): 36.7 (29 Jun 2023 12:55), Max: 36.9 (29 Jun 2023 06:01)  T(F): 98 (29 Jun 2023 12:55), Max: 98.5 (29 Jun 2023 06:01)  HR: 69 (29 Jun 2023 12:55) (69 - 81)  BP: 163/73 (29 Jun 2023 12:55) (155/73 - 194/78)  BP(mean): 103 (29 Jun 2023 12:55) (103 - 106)  RR: 18 (29 Jun 2023 09:48) (18 - 18)  SpO2: 95% (29 Jun 2023 09:48) (95% - 98%)    Parameters below as of 29 Jun 2023 09:48  Patient On (Oxygen Delivery Method): room air        PHYSICAL EXAM  General: NAD, AA0x3    Lower Extremity Focused:  Vasc: DP/PT 2/4 b/l, no erythema or edema   Derm: no open wounds, pre-ulcerative lesion on R medial IPJ, hyperkeratotic lesion on medial hallucal IPJ, discolored dystrophic nails x10, ingrown medial border of R hallucal nail   Neuro: Protective sensation in tact  MSK: TTP R distal hallux, bunion deformity b/l, hammered digits b/l     RADIOLOGY  XR R foot wet read: no cortical erosions/no evidence of gas/om/foreign body/fx                      
  Patient is a 89y old  Female who presents with a chief complaint of Back pain and abdominal pain. (2023 11:29)      HPI:  89F poor historian PMH HTN, meningioma, Crohn's, hypothyroidism presents for back pain ongoing for months that has worsened in the last week. Pain is located in the lumbar area and does not radiate. She ambulates with a cane, denies recent fall. She has been taking percocets for pain. Also c/o abdominal pain for the past 2 months and nausea as well as headache for the last 8-9 months. No fevers, chills, diarrhea. Has not seen GI recently. Has not followed up with anyone for meningioma. Has HHA. Was sent in for admission by Dr. Neely.    In the ED VSS. Labs s/f anemia, thrombocytopenia. UA negative.   CT A&P s/f colitis involving the ascending colon and cecum of  infectious or inflammatory etiology. No bowel obstruction.  EKG nonischemic with normal QTc  Received zofran, reglan, 500cc NS   (2023 21:29)    PAST MEDICAL & SURGICAL HISTORY:  HTN (hypertension)      Brain tumor      Other specified hypothyroidism      History of partial hysterectomy      History of appendectomy        MEDICATIONS  (STANDING):  amLODIPine   Tablet 2.5 milliGRAM(s) Oral every 24 hours  enoxaparin Injectable 40 milliGRAM(s) SubCutaneous every 24 hours  hydrALAZINE 10 milliGRAM(s) Oral three times a day  levothyroxine 88 MICROGram(s) Oral daily  losartan 50 milliGRAM(s) Oral every 24 hours  metoprolol succinate ER 50 milliGRAM(s) Oral every 24 hours  pantoprazole    Tablet 40 milliGRAM(s) Oral before breakfast  polyethylene glycol 3350 17 Gram(s) Oral daily  senna 2 Tablet(s) Oral at bedtime  simvastatin 40 milliGRAM(s) Oral at bedtime    MEDICATIONS  (PRN):  LORazepam     Tablet 2 milliGRAM(s) Oral at bedtime PRN Anxiety  melatonin 3 milliGRAM(s) Oral at bedtime PRN Insomnia  ondansetron Injectable 4 milliGRAM(s) IV Push every 8 hours PRN Nausea and/or Vomiting  oxycodone    5 mG/acetaminophen 325 mG 1 Tablet(s) Oral every 6 hours PRN Moderate Pain (4 - 6)          FAMILY HISTORY:  Family history of cancer (Sibling)        CBC Full  -  ( 2023 12:31 )  WBC Count : 5.07 K/uL  RBC Count : 3.56 M/uL  Hemoglobin : 9.8 g/dL  Hematocrit : 31.2 %  Platelet Count - Automated : 148 K/uL  Mean Cell Volume : 87.6 fl  Mean Cell Hemoglobin : 27.5 pg  Mean Cell Hemoglobin Concentration : 31.4 gm/dL  Auto Neutrophil # : 2.13 K/uL  Auto Lymphocyte # : 2.21 K/uL  Auto Monocyte # : 0.54 K/uL  Auto Eosinophil # : 0.14 K/uL  Auto Basophil # : 0.04 K/uL  Auto Neutrophil % : 41.9 %  Auto Lymphocyte % : 43.6 %  Auto Monocyte % : 10.7 %  Auto Eosinophil % : 2.8 %  Auto Basophil % : 0.8 %          139  |  106  |  x   ----------------------------<  x   4.0   |  x   |  x     Ca    9.0      2023 16:54  Mg     1.8         TPro  6.3  /  Alb  3.7  /  TBili  <0.2  /  DBili  <0.2  /  AST  15  /  ALT  9<L>  /  AlkPhos  62        Urinalysis Basic - ( 2023 18:01 )    Color: Yellow / Appearance: Clear / S.020 / pH: x  Gluc: x / Ketone: NEGATIVE  / Bili: Negative / Urobili: 0.2 E.U./dL   Blood: x / Protein: NEGATIVE mg/dL / Nitrite: NEGATIVE   Leuk Esterase: Trace / RBC: < 5 /HPF / WBC < 5 /HPF   Sq Epi: x / Non Sq Epi: x / Bacteria: Present /HPF        Radiology :     < from: CT Abdomen and Pelvis w/ Oral Cont and w/ IV Cont (23 @ 19:12) >  ACC: [id number] EXAM:  CT ABDOMEN AND PELVIS OC IC   ORDERED BY: CHAPITO CASTELLANOS     PROCEDURE DATE:  2023          INTERPRETATION:  CONTRAST/COMPLICATIONS:  IV Contrast: Isovue 370  90 cc administered   10 cc discarded  Oral Contrast: Omnipaque 300  Complications: None reported at time of study completion    CT of the ABDOMEN and PELVIS with intravenous contrast dated 2023   7:12 PM    INDICATION: abd pain    TECHNIQUE: CT of the abdomen and pelvis was performed. Axial and coronal  and sagittal images were produced and reviewed.    PRIOR STUDIES: None.    FINDINGS: Images of the lower chest demonstrate moderate hiatal hernia.    The liver is normal in size. Two liver cysts up to 1.8 cm diameter. No   intrahepatic biliary dilatation. The common bile duct is dilated   measuring up to 1.0 cm.      No radiopaque stones are seen in the gallbladder.     The pancreas is normal in appearance.     No splenic abnormalities are seen.    Normal right adrenal gland. 1.9 x 1.3 x 2.0 cmleft adrenal nodule.    The kidneys are normal in size. No hydronephrosis. 2.5 cm cortical cyst   upper pole left kidney. Couple of left renal cortical cysts up to 1.2 cm   diameter.    No abdominal aortic aneurysm is seen.    No lymphadenopathy is seen.    Evaluation of the bowel demonstrates that the ingested oral contrast   material has reached the distal transverse colon. No bowel obstruction.   Scattered colonic diverticula. No evidence of colonic diverticulitis.   Wall thickening of cecum and ascending colon suspicious for colitis.   Questionable mild mucosal enhancement and submucosal edema of the   descending colon and sigmoid. Abnormal appendix is not identified.    No ascites is seen.    Images of the pelvis demonstrate that the patient is status post   hysterectomy. A right ovary is not identified likely status post right   oophorectomy. Unremarkable left ovary.    Grossly normal bladder.    Fat-containing bilateral indirect inguinal hernias.    Evaluation of the osseous structures demonstrates left proximal femoral   Christensen compression screw plate. Multilevel lumbar degenerative disc   disease. Grade 1 degenerative anterolisthesis of L4 on L5 persists severe   central canal stenosis L4-5. Lumbar levoscoliosis.  Osteitis pubis.      IMPRESSION:  Hiatal hernia.  Suspected colitis involving the ascending colon and cecum of  infectious   or inflammatory etiology.  No bowel obstruction.  Status post hysterectomy and right oophorectomy.             REVIEW OF SYSTEMS:  per hpi         Vital Signs Last 24 Hrs  T(C): 36.5 (2023 09:21), Max: 36.6 (2023 16:12)  T(F): 97.7 (2023 09:21), Max: 97.8 (2023 16:12)  HR: 61 (2023 09:21) (61 - 97)  BP: 127/61 (2023 11:48) (113/61 - 176/81)  BP(mean): 83 (2023 11:48) (83 - 91)  RR: 18 (2023 09:21) (16 - 18)  SpO2: 95% (2023 09:21) (94% - 95%)    Parameters below as of 2023 09:21  Patient On (Oxygen Delivery Method): room air            Physical Exam :   89 y o woman lying comfortably in semi Peters's position , awake , alert , no acute complaints     Head : normocephalic , atraumatic    Eyes : PERRLA , EOMI , no nystagmus , sclera anicteric    ENT / FACE : neg nasal discharge , uvula midline , no oropharyngeal erythema / exudate    Neck : supple , negative JVD , negative carotid bruits , no thyromegaly    Chest : CTA bilaterally , neg wheeze / rhonchi / rales / crackles / egophany    Cardiovascular : regular rate and rhythm , neg murmurs / rubs / gallops    Abdomen : soft , non distended ,RUQ ttp ,  normal bowel sounds     Extremities : WWP , neg cyanosis /clubbing / edema     Neurologic Exam :    Alert and oriented to person , place     Motor Exam:          Right UE:               no focal weakness ,  > 3+/5 throughout      Left UE:                 no focal weakness ,  > 3+/5 throughout          Right LE:    no focal weakness ,  > 3+/5 throughout        Left LE:    no focal weakness ,  > 3+/5 throughout         Sensation :         intact to light touch x 4 extremities       DTR :     biceps/brachioradialis : equal                      patella/ankle : equal        Gait :  not tested          PM&R Impression :     admitted for c/o non traumatic low back pain     -  colitis and L4/5 stenosis on CT A/P     - deconditioned    - no focal weakness         Recommendations / Plan :              1) Physical / Occupational therapy focusing on therapeutic exercises , equipment evaluation , bed mobility/transfer out of bed evaluation , progressive ambulation with assistive devices prn .    2) Current disposition plan recommendation  :    pending functional progress

## 2023-06-29 NOTE — PROGRESS NOTE ADULT - ASSESSMENT
89F poor historian PMH HTN, meningioma, Crohn's, hypothyroidism presents for back pain ongoing for a week and abdominal pain going on for months. Admitted for pain control. 
89F poor historian PMH HTN, meningioma, Crohn's, hypothyroidism presents for back pain ongoing for a week and abdominal pain going on for months. Admitted for pain control.

## 2023-06-29 NOTE — PROGRESS NOTE ADULT - PROBLEM SELECTOR PLAN 5
- c/w losartan 50mg, toprol 50mg, amlodipine 2.5mg, hydral 10mg TID  - also taking felodipine 5mg BID > would dc and increase amlodipine if needed
- c/w losartan 50mg, toprol 50mg, amlodipine 2.5mg, hydral 10mg TID  - also taking felodipine 5mg BID > would dc and increase amlodipine if needed

## 2023-06-29 NOTE — PROGRESS NOTE ADULT - PROBLEM SELECTOR PLAN 9
ativan 2mg present in surescripts  - iSTOP  - c/w ativan 2 mg prn qhs > outpatient taper
F: PO  E: replete PRN  N: dash  DVT ppx: lovenox  GI PPx: PPI    FULL CODE    Dispo: [facility]

## 2023-06-29 NOTE — PROGRESS NOTE ADULT - PROBLEM SELECTOR PLAN 8
Pt complained of rash under her eye (--). Likely from the IV magnesium.   Plan: 1 dose diphenhydramine 25 mg given. Pt complained of rash under her eye (--). Likely reactive from the IV magnesium.   Plan: 1 dose diphenhydramine 25 mg given.

## 2023-06-29 NOTE — PROGRESS NOTE ADULT - PROBLEM SELECTOR PLAN 4
c/o of headaches for the last 8-9 months. Neuro exam WNL.  fu MRI head  No acute interventions. c/o of headaches for the last 8-9 months. Neuro exam WNL.  No acute interventions.  MRI dc'd as pt would not benefit from inpt stay. Pt also states she will not proceed with any surgical interventions if any abnormality is found and would like pain control and conservation management

## 2023-06-29 NOTE — PROGRESS NOTE ADULT - SUBJECTIVE AND OBJECTIVE BOX
OVERNIGHT EVENTS: No overnight acute events.     SUBJECTIVE / INTERVAL HPI: Patient seen and examined at bedside. Still complained of abdominal discomfort but not in acute distress. She does not complain of diarrhea anymore. Patient had a rash under eye today, 1 dose of benadryl given.     VITAL SIGNS:  Vital Signs Last 24 Hrs  T(C): 36.7 (29 Jun 2023 12:55), Max: 36.9 (29 Jun 2023 06:01)  T(F): 98 (29 Jun 2023 12:55), Max: 98.5 (29 Jun 2023 06:01)  HR: 69 (29 Jun 2023 12:55) (69 - 81)  BP: 163/73 (29 Jun 2023 12:55) (155/73 - 194/78)  BP(mean): 103 (29 Jun 2023 12:55) (103 - 106)  RR: 18 (29 Jun 2023 09:48) (18 - 18)  SpO2: 95% (29 Jun 2023 09:48) (95% - 98%)    Parameters below as of 29 Jun 2023 09:48  Patient On (Oxygen Delivery Method): room air        PHYSICAL EXAM:    General: NAD  HEENT: NC/AT; PERRL, anicteric sclera; MMM  Neck: supple w/o palpable nodularity  Cardiovascular: +S1/S2; RRR  Respiratory: CTA B/L; no W/R/R  Gastrointestinal: soft, slightly tender/ND; +BSx4  Extremities: WWP; no edema, clubbing or cyanosis  Vascular: 2+ radial, DP/PT pulses B/L  Neurological: AAOx3; no focal deficits    MEDICATIONS:  MEDICATIONS  (STANDING):  amLODIPine   Tablet 2.5 milliGRAM(s) Oral every 24 hours  enoxaparin Injectable 30 milliGRAM(s) SubCutaneous every 24 hours  hydrALAZINE 10 milliGRAM(s) Oral three times a day  iron sucrose IVPB 300 milliGRAM(s) IV Intermittent every 24 hours  levothyroxine 88 MICROGram(s) Oral daily  losartan 50 milliGRAM(s) Oral every 24 hours  metoprolol succinate ER 50 milliGRAM(s) Oral every 24 hours  pantoprazole    Tablet 40 milliGRAM(s) Oral before breakfast  simvastatin 40 milliGRAM(s) Oral at bedtime    MEDICATIONS  (PRN):  LORazepam     Tablet 2 milliGRAM(s) Oral at bedtime PRN Anxiety  melatonin 3 milliGRAM(s) Oral at bedtime PRN Insomnia  ondansetron Injectable 4 milliGRAM(s) IV Push every 8 hours PRN Nausea and/or Vomiting  oxycodone    5 mG/acetaminophen 325 mG 1 Tablet(s) Oral every 6 hours PRN Moderate Pain (4 - 6)      ALLERGIES:  Allergies    No Known Allergies    Intolerances        LABS:                        9.2    3.44  )-----------( 123      ( 29 Jun 2023 12:00 )             30.1     06-29    139  |  107  |  15  ----------------------------<  154<H>  3.8   |  20<L>  |  0.96    Ca    8.4      29 Jun 2023 12:00  Phos  3.6     06-29  Mg     2.9     06-29    TPro  6.1  /  Alb  3.4  /  TBili  0.2  /  DBili  x   /  AST  12  /  ALT  8<L>  /  AlkPhos  55  06-29      Urinalysis Basic - ( 29 Jun 2023 12:00 )    Color: x / Appearance: x / SG: x / pH: x  Gluc: 154 mg/dL / Ketone: x  / Bili: x / Urobili: x   Blood: x / Protein: x / Nitrite: x   Leuk Esterase: x / RBC: x / WBC x   Sq Epi: x / Non Sq Epi: x / Bacteria: x        RADIOLOGY & ADDITIONAL TESTS: Reviewed.  
89F poor historian PMH HTN, meningioma, Crohn's, hypothyroidism presents for back pain and abdominal pain going on for months. Back pain has been persistent for years but became worse in the last week. Pt describes abdominal pain as diffuse, mostly in the RLQ.  Admitted for pain control. Denies fever, diarrhea, constipation, changes in stool, SOB and chest pain.     SUBJECTIVE / INTERVAL HPI: Patient seen and examined at bedside. Not in acute distress, AO x3.       VITAL SIGNS:  Vital Signs Last 24 Hrs  T(C): 36.5 (2023 09:21), Max: 36.6 (2023 16:12)  T(F): 97.7 (2023 09:21), Max: 97.8 (2023 16:12)  HR: 61 (2023 09:21) (61 - 97)  BP: 138/68 (2023 09:21) (113/61 - 176/81)  BP(mean): 91 (2023 09:21) (91 - 91)  RR: 18 (2023 09:21) (16 - 18)  SpO2: 95% (2023 09:21) (94% - 95%)    Parameters below as of 2023 09:21  Patient On (Oxygen Delivery Method): room air      PHYSICAL EXAM:    General: NAD  HEENT: NC/AT; PERRL, anicteric sclera; MMM  Neck: supple w/o palpable nodularity  Cardiovascular: +S1/S2; RRR  Respiratory: CTA B/L; no W/R/R  Gastrointestinal: soft, +diffuse tenderness-mostly RLQ; +BSx4  Extremities: WWP; no edema, clubbing or cyanosis  Vascular: 2+ radial, DP/PT pulses B/L  Neurological: AAOx3; no focal deficits    MEDICATIONS:  MEDICATIONS  (STANDING):  amLODIPine   Tablet 2.5 milliGRAM(s) Oral every 24 hours  enoxaparin Injectable 40 milliGRAM(s) SubCutaneous every 24 hours  hydrALAZINE 10 milliGRAM(s) Oral three times a day  levothyroxine 88 MICROGram(s) Oral daily  losartan 50 milliGRAM(s) Oral every 24 hours  metoprolol succinate ER 50 milliGRAM(s) Oral every 24 hours  pantoprazole    Tablet 40 milliGRAM(s) Oral before breakfast  polyethylene glycol 3350 17 Gram(s) Oral daily  senna 2 Tablet(s) Oral at bedtime  simvastatin 40 milliGRAM(s) Oral at bedtime    MEDICATIONS  (PRN):  LORazepam     Tablet 2 milliGRAM(s) Oral at bedtime PRN Anxiety  melatonin 3 milliGRAM(s) Oral at bedtime PRN Insomnia  ondansetron Injectable 4 milliGRAM(s) IV Push every 8 hours PRN Nausea and/or Vomiting  oxycodone    5 mG/acetaminophen 325 mG 1 Tablet(s) Oral every 6 hours PRN Moderate Pain (4 - 6)      ALLERGIES:  Allergies    No Known Allergies    Intolerances        LABS:                        9.7    4.44  )-----------( 144      ( 2023 16:54 )             31.3         144  |  112<H>  |  25<H>  ----------------------------<  100<H>  4.4   |  22  |  1.06    Ca    9.0      2023 16:54    TPro  6.3  /  Alb  3.7  /  TBili  <0.2  /  DBili  <0.2  /  AST  15  /  ALT  9<L>  /  AlkPhos  62        Urinalysis Basic - ( 2023 18:01 )    Color: Yellow / Appearance: Clear / S.020 / pH: x  Gluc: x / Ketone: NEGATIVE  / Bili: Negative / Urobili: 0.2 E.U./dL   Blood: x / Protein: NEGATIVE mg/dL / Nitrite: NEGATIVE   Leuk Esterase: Trace / RBC: < 5 /HPF / WBC < 5 /HPF   Sq Epi: x / Non Sq Epi: x / Bacteria: Present /HPF    CT abd/pelvis: Hiatal hernia. Suspected colitis involving the ascending colon and cecum of  infectious   or inflammatory etiology. No bowel obstruction.  Status post hysterectomy and right oophorectomy.  RADIOLOGY & ADDITIONAL TESTS: Reviewed.

## 2023-06-29 NOTE — CONSULT NOTE ADULT - ASSESSMENT
89 y/o F pmh htn, meningioma, Crohn's, and hypothyroidism admitted for generalized pain. Podiatry consulted to evaluate R toe lesion. At time of consult, VSS, WBC 3.44, ESR 9, CRP <3. X-ray R foot wet read show evidence of om/gas/foreign body. Clinically, no open wounds, no clinical signs of infection of R hallux    Plan:   - Excisional debridement of pre-ulcerative lesions down to and including the level of epidermis with #15 blade  - Debrided toenails with nail nipper  - Reviewed x-rays & d/w pt  - Educated pt on proper foot care  - Pt should follow up with her outpatient podiatrist for further regular foot care.   - Rest of care per primary team     Plan pending d/w Attending. Podiatry signing off. Please re-consult as needed.  89 y/o F pmh htn, meningioma, Crohn's, and hypothyroidism admitted for generalized pain. Podiatry consulted to evaluate R toe lesion. At time of consult, VSS, WBC 3.44, ESR 9, CRP <3. X-ray R foot wet read show evidence of om/gas/foreign body. Clinically, no open wounds, no clinical signs of infection of R hallux    Plan:   - Excisional debridement of pre-ulcerative lesions down to and including the level of epidermis with #15 blade  - Debrided toenails with nail nipper  - Reviewed x-rays & d/w pt  - Educated pt on proper foot care  - Pt should follow up with her outpatient podiatrist for further regular foot care.   - Rest of care per primary team     Plan d/w Attending. Podiatry signing off. Please re-consult as needed.

## 2023-06-29 NOTE — PROGRESS NOTE ADULT - PROBLEM SELECTOR PLAN 1
Ongoing for months. Has been taking percocets at home. Neuro exam WNL. Ambulating around room comfortably. CT A&P showing L4-L5 canal stenosis, ddx also includes compression fracture.  CT thoracic: Mild to moderate chronic compression fracture deformity of T12. No acute fracture identified in the thoracic and lumbar spine. Multilevel lumbar spondylitic changes as above most notable at the L4-L5   level where there is mild anterolisthesis, moderate to severe canal  stenosis, mild to moderate bilateral foraminal stenosis and moderate to severe bilateral lateral recess stenosis. No significant canal or foraminal stenosis of the thoracic spine.  CT Lumbar: Mild to moderate chronic compression fracture deformity of T12. No acute fracture identified in the thoracic and lumbar spine. Multilevel lumbar spondylitic changes as above most notable at the L4-L5   level where there is mild anterolisthesis, moderate to severe canal   stenosis, mild to moderate bilateral foraminal stenosis and moderate to   severe bilateral lateral recess stenosis..No significant canal or foraminal stenosis of the thoracic spine.  - iSTOP  - c/w percocet and bowel regimen Ongoing for months. Has been taking percocets at home. Neuro exam WNL. Ambulating around room comfortably. CT A&P showing L4-L5 canal stenosis, ddx also includes compression fracture.  CT thoracic: Mild to moderate chronic compression fracture deformity of T12. No acute fracture identified in the thoracic and lumbar spine. Multilevel lumbar spondylitic changes as above most notable at the L4-L5   level where there is mild anterolisthesis, moderate to severe canal  stenosis, mild to moderate bilateral foraminal stenosis and moderate to severe bilateral lateral recess stenosis. No significant canal or foraminal stenosis of the thoracic spine.  CT Lumbar: Mild to moderate chronic compression fracture deformity of T12. No acute fracture identified in the thoracic and lumbar spine. Multilevel lumbar spondylitic changes as above most notable at the L4-L5   level where there is mild anterolisthesis, moderate to severe canal   stenosis, mild to moderate bilateral foraminal stenosis and moderate to   severe bilateral lateral recess stenosis. No significant canal or foraminal stenosis of the thoracic spine.  - iSTOP  - c/w percocet and bowel regimen

## 2023-07-06 ENCOUNTER — APPOINTMENT (OUTPATIENT)
Dept: GASTROENTEROLOGY | Facility: CLINIC | Age: 88
End: 2023-07-06
Payer: MEDICARE

## 2023-07-06 VITALS
WEIGHT: 116 LBS | HEART RATE: 81 BPM | TEMPERATURE: 96.5 F | BODY MASS INDEX: 21.9 KG/M2 | DIASTOLIC BLOOD PRESSURE: 75 MMHG | SYSTOLIC BLOOD PRESSURE: 154 MMHG | HEIGHT: 61 IN | OXYGEN SATURATION: 98 %

## 2023-07-06 DIAGNOSIS — M54.9 DORSALGIA, UNSPECIFIED: ICD-10-CM

## 2023-07-06 DIAGNOSIS — L60.0 INGROWING NAIL: ICD-10-CM

## 2023-07-06 DIAGNOSIS — R10.9 UNSPECIFIED ABDOMINAL PAIN: ICD-10-CM

## 2023-07-06 DIAGNOSIS — M20.42 OTHER HAMMER TOE(S) (ACQUIRED), LEFT FOOT: ICD-10-CM

## 2023-07-06 DIAGNOSIS — M21.612 BUNION OF LEFT FOOT: ICD-10-CM

## 2023-07-06 DIAGNOSIS — L27.1 LOCALIZED SKIN ERUPTION DUE TO DRUGS AND MEDICAMENTS TAKEN INTERNALLY: ICD-10-CM
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
## 2023-07-06 DIAGNOSIS — M48.061 SPINAL STENOSIS, LUMBAR REGION WITHOUT NEUROGENIC CLAUDICATION: ICD-10-CM

## 2023-07-06 DIAGNOSIS — K50.90 CROHN'S DISEASE, UNSPECIFIED, WITHOUT COMPLICATIONS: ICD-10-CM

## 2023-07-06 DIAGNOSIS — M21.611 BUNION OF RIGHT FOOT: ICD-10-CM

## 2023-07-06 DIAGNOSIS — M84.48XA PATHOLOGICAL FRACTURE, OTHER SITE, INITIAL ENCOUNTER FOR FRACTURE: ICD-10-CM

## 2023-07-06 DIAGNOSIS — D32.9 BENIGN NEOPLASM OF MENINGES, UNSPECIFIED: ICD-10-CM

## 2023-07-06 DIAGNOSIS — D50.9 IRON DEFICIENCY ANEMIA, UNSPECIFIED: ICD-10-CM

## 2023-07-06 DIAGNOSIS — E03.9 HYPOTHYROIDISM, UNSPECIFIED: ICD-10-CM

## 2023-07-06 DIAGNOSIS — T47.4X5A ADVERSE EFFECT OF OTHER LAXATIVES, INITIAL ENCOUNTER: ICD-10-CM

## 2023-07-06 DIAGNOSIS — M20.41 OTHER HAMMER TOE(S) (ACQUIRED), RIGHT FOOT: ICD-10-CM

## 2023-07-06 DIAGNOSIS — L60.3 NAIL DYSTROPHY: ICD-10-CM
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
## 2023-07-06 DIAGNOSIS — E78.5 HYPERLIPIDEMIA, UNSPECIFIED: ICD-10-CM

## 2023-07-06 DIAGNOSIS — I10 ESSENTIAL (PRIMARY) HYPERTENSION: ICD-10-CM
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
## 2023-07-06 DIAGNOSIS — D69.6 THROMBOCYTOPENIA, UNSPECIFIED: ICD-10-CM

## 2023-07-06 DIAGNOSIS — F13.90 SEDATIVE, HYPNOTIC, OR ANXIOLYTIC USE, UNSPECIFIED, UNCOMPLICATED: ICD-10-CM

## 2023-07-06 DIAGNOSIS — L98.9 DISORDER OF THE SKIN AND SUBCUTANEOUS TISSUE, UNSPECIFIED: ICD-10-CM

## 2023-07-06 PROCEDURE — 99203 OFFICE O/P NEW LOW 30 MIN: CPT

## 2023-07-07 NOTE — ASSESSMENT
[FreeTextEntry1] : 89 yo F with pmh Crohns disease (colitis?, dx >30 years ago), meningioma, HTN, HLD, hypothyroid referred by Dr. Neely for IBD care, here today with complaint of abdominal pain and regurgitation, weight loss, and abnormal CT scan.\par \par #Crohns disease ?colitis\par #Abdominal pain\par Diagnosed >30 years ago, last cscope >10 years ago, not currently on therapy\par CT scan with right sided colonic inflammation (as well as ileal wall thickening but no e/o obstruction by my review)\par Ideally would perform colonoscopy for further evaluation of Crohns disease and r/o malignancy, prior to consideration of initiation of therapy and patient agrees with plan. Will schedule @[facility].\par Will request prior reports from Dr. Checo Mariscal's office\par Cardiac/ medical clearance by Dr. Neely\par \par #Dysphagia/ Regurgitation/weight loss\par Possibly from hiatal hernia noted on CT AP v dysmotility or obstruction/ malignancy\par Likely cause of wt loss/ food aversion\par Start with esophagram for further evaluation\par ?related to meningioma vs. neurodegerative d/o; \par \par Monique Goodman MD\par GI Fellow

## 2023-07-07 NOTE — PHYSICAL EXAM
[Alert] : alert [Well Developed] : well developed [Sclera] : the sclera and conjunctiva were normal [Hearing Threshold Finger Rub Not [place]] : hearing was normal [Normal Appearance] : the appearance of the neck was normal [No Respiratory Distress] : no respiratory distress [Abdomen Soft] : soft [Oriented To Time, Place, And Person] : oriented to person, place, and time [de-identified] : Diffuse mild tenderness

## 2023-07-07 NOTE — CONSULT LETTER
[Dear  ___] : Dear  [unfilled], [Consult Letter:] : I had the pleasure of evaluating your patient, [unfilled]. [Please see my note below.] : Please see my note below. [Consult Closing:] : Thank you very much for allowing me to participate in the care of this patient.  If you have any questions, please do not hesitate to contact me. [Sincerely,] : Sincerely, [FreeTextEntry3] : Maximo Martinez MD\par Professor of Medicine\par Chief of GI\par Director IBD Program\par [facility]\par

## 2023-07-07 NOTE — HISTORY OF PRESENT ILLNESS
[FreeTextEntry1] : 89 yo F with pmh Crohns disease (colitis?, dx >30 years ago), meningioma, HTN, HLD, hypothyroid referred by Dr. Neely for IBD care, here today with complaint of abdominal pain and regurgitation.\par \par CD hx:\par She states she was diagnosed >30 years ago, \par last colonoscopy >10 years ago, \par she is not currently on therapy for crohns, unsure what therapies she may have been on previously\par Unknown where she was affected by her CD\par She previously saw a GI at [facility] [place], last saw ~7-8 years ago\par \par Recent Hx:\par She was recently admitted to [facility] 6/27-29 for abdominal pain/ back pain for many months. \par She also c/o 20lb wt loss and poor po intake because eating makes her feel sick (unable to specify).\par She notes regurgitation of food and mucus when she eats and sensation food is not going down. This has been ongoing for months- years.\par She has 1 daily BM with complete evacuation but has tight, twisting pain before using the bathroom. She takes a herbal laxative.\par At [facility] she underwent CT AP with right sided colitis and hiatal hernia. There was also significant stool burden.\par She also gets headaches from meningioma and takes Percocet. Previously following at [facility]. Last scan > 1 year ago. \par \par She denies blood in stool, F/c.\par \par Born in [facility]

## 2023-07-10 LAB
24R-OH-CALCIDIOL SERPL-MCNC: 63.8 PG/ML
25(OH)D3 SERPL-MCNC: 33.5 NG/ML
ALBUMIN MFR SERPL ELPH: 58.3 %
ALBUMIN SERPL ELPH-MCNC: 4.2 G/DL
ALBUMIN SERPL-MCNC: 3.9 G/DL
ALBUMIN/GLOB SERPL: 1.4 RATIO
ALP BLD-CCNC: 67 U/L
ALP BONE SERPL-MCNC: 14.8 UG/L
ALPHA1 GLOB MFR SERPL ELPH: 3.9 %
ALPHA1 GLOB SERPL ELPH-MCNC: 0.3 G/DL
ALPHA2 GLOB MFR SERPL ELPH: 11.2 %
ALPHA2 GLOB SERPL ELPH-MCNC: 0.8 G/DL
ALT SERPL-CCNC: 9 U/L
ANA PAT FLD IF-IMP: ABNORMAL
ANA SER IF-ACNC: ABNORMAL
ANION GAP SERPL CALC-SCNC: 17 MMOL/L
APPEARANCE: CLEAR
AST SERPL-CCNC: 14 U/L
B-GLOBULIN MFR SERPL ELPH: 12.6 %
B-GLOBULIN SERPL ELPH-MCNC: 0.8 G/DL
BACTERIA: NEGATIVE /HPF
BILIRUB SERPL-MCNC: <0.2 MG/DL
BILIRUBIN URINE: NEGATIVE
BLOOD URINE: NEGATIVE
BUN SERPL-MCNC: 24 MG/DL
C3 SERPL-MCNC: 117 MG/DL
C4 SERPL-MCNC: 29 MG/DL
CALCIUM SERPL-MCNC: 9.5 MG/DL
CALCIUM SERPL-MCNC: 9.5 MG/DL
CANCER AG125 SERPL-ACNC: 24 U/ML
CANCER AG15-3 SERPL-ACNC: 16.4 U/ML
CANCER AG19-9 SERPL-ACNC: 26 U/ML
CANCER AG27-29 SERPL-ACNC: 14.9 U/ML
CAST: 0 /LPF
CEA SERPL-MCNC: 4.1 NG/ML
CHLORIDE SERPL-SCNC: 107 MMOL/L
CHOLEST SERPL-MCNC: 162 MG/DL
CO2 SERPL-SCNC: 17 MMOL/L
COLLAGEN CTX SERPL-MCNC: 710 PG/ML
COLOR: YELLOW
CREAT SERPL-MCNC: 1.09 MG/DL
CYSTATIN C SERPL-MCNC: 1.72 MG/L
DEPRECATED KAPPA LC FREE/LAMBDA SER: 2.26 RATIO
EGFR: 47 ML/MIN/1.73M2
EPITHELIAL CELLS: 1 /HPF
ERYTHROCYTE [SEDIMENTATION RATE] IN BLOOD BY WESTERGREN METHOD: 19 MM/HR
ESTIMATED AVERAGE GLUCOSE: 120 MG/DL
FERRITIN SERPL-MCNC: 12 NG/ML
FOLATE SERPL-MCNC: 7.2 NG/ML
GAMMA GLOB FLD ELPH-MCNC: 0.9 G/DL
GAMMA GLOB MFR SERPL ELPH: 14 %
GFR/BSA.PRED SERPLBLD CYS-BASED-ARV: 31 ML/MIN/1.73M2
GLUCOSE QUALITATIVE U: NEGATIVE MG/DL
GLUCOSE SERPL-MCNC: 96 MG/DL
HBA1C MFR BLD HPLC: 5.8 %
HBV SURFACE AB SER QL: NONREACTIVE
HBV SURFACE AG SER QL: NONREACTIVE
HCV AB SER QL: NONREACTIVE
HCV S/CO RATIO: 0.12 S/CO
HCYS SERPL-MCNC: 22.5 UMOL/L
HDLC SERPL-MCNC: 47 MG/DL
IGA 24H UR QL IFE: NORMAL
IGA SER QL IEP: 261 MG/DL
IGG SER QL IEP: 935 MG/DL
IGM SER QL IEP: 97 MG/DL
INTERPRETATION SERPL IEP-IMP: NORMAL
IRON SATN MFR SERPL: 7 %
IRON SERPL-MCNC: 25 UG/DL
KAPPA LC CSF-MCNC: 2.78 MG/DL
KAPPA LC SERPL-MCNC: 6.28 MG/DL
KETONES URINE: NEGATIVE MG/DL
LDLC SERPL CALC-MCNC: 82 MG/DL
LEUKOCYTE ESTERASE URINE: NEGATIVE
M PROTEIN SPEC IFE-MCNC: NORMAL
MAGNESIUM SERPL-MCNC: 2 MG/DL
METHYLMALONATE SERPL-SCNC: 182 NMOL/L
MICROSCOPIC-UA: NORMAL
NITRITE URINE: NEGATIVE
NONHDLC SERPL-MCNC: 115 MG/DL
PARATHYROID HORMONE INTACT: 56 PG/ML
PH URINE: 5.5
PHOSPHATE SERPL-MCNC: 4.1 MG/DL
POTASSIUM SERPL-SCNC: 4.8 MMOL/L
PROT SERPL-MCNC: 6.7 G/DL
PROTEIN URINE: NORMAL MG/DL
RED BLOOD CELLS URINE: 1 /HPF
RHEUMATOID FACT SER QL: <10 IU/ML
SODIUM SERPL-SCNC: 140 MMOL/L
SPECIFIC GRAVITY URINE: 1.01
T3FREE SERPL-MCNC: 2.48 PG/ML
T3RU NFR SERPL: 0.9 TBI
T4 FREE SERPL-MCNC: 1.6 NG/DL
T4 SERPL-MCNC: 7.7 UG/DL
THYROGLOB AB SERPL-ACNC: ABNORMAL IU/ML
THYROPEROXIDASE AB SERPL IA-ACNC: 2501 IU/ML
TIBC SERPL-MCNC: 386 UG/DL
TRIGL SERPL-MCNC: 169 MG/DL
TSH SERPL-ACNC: 0.47 UIU/ML
UIBC SERPL-MCNC: 361 UG/DL
URATE SERPL-MCNC: 5.8 MG/DL
UROBILINOGEN URINE: 0.2 MG/DL
VIT B12 SERPL-MCNC: 269 PG/ML
WHITE BLOOD CELLS URINE: 1 /HPF

## 2023-09-18 ENCOUNTER — RX RENEWAL (OUTPATIENT)
Age: 88
End: 2023-09-18

## 2023-09-18 RX ORDER — AMLODIPINE BESYLATE 2.5 MG/1
2.5 TABLET ORAL
Qty: 90 | Refills: 0 | Status: ACTIVE | COMMUNITY
Start: 2023-06-19 | End: 1900-01-01

## 2023-11-01 ENCOUNTER — APPOINTMENT (OUTPATIENT)
Dept: NEPHROLOGY | Facility: CLINIC | Age: 88
End: 2023-11-01
Payer: MEDICARE

## 2023-11-01 VITALS — SYSTOLIC BLOOD PRESSURE: 180 MMHG | DIASTOLIC BLOOD PRESSURE: 70 MMHG

## 2023-11-01 VITALS — WEIGHT: 113.98 LBS | BODY MASS INDEX: 21.54 KG/M2

## 2023-11-01 PROCEDURE — 99214 OFFICE O/P EST MOD 30 MIN: CPT

## 2023-11-01 RX ORDER — HYDRALAZINE HYDROCHLORIDE 10 MG/1
10 TABLET ORAL
Refills: 0 | Status: ACTIVE | COMMUNITY

## 2023-11-06 ENCOUNTER — INPATIENT (INPATIENT)
Facility: HOSPITAL | Age: 88
LOS: 2 days | Discharge: HOME | DRG: 551 | End: 2023-11-09
Attending: STUDENT IN AN ORGANIZED HEALTH CARE EDUCATION/TRAINING PROGRAM | Admitting: INTERNAL MEDICINE
Payer: MEDICARE

## 2023-11-06 VITALS
HEART RATE: 63 BPM | OXYGEN SATURATION: 97 % | DIASTOLIC BLOOD PRESSURE: 74 MMHG | RESPIRATION RATE: 18 BRPM | SYSTOLIC BLOOD PRESSURE: 176 MMHG | TEMPERATURE: 98 F

## 2023-11-06 DIAGNOSIS — Z90.710 ACQUIRED ABSENCE OF BOTH CERVIX AND UTERUS: Chronic | ICD-10-CM

## 2023-11-06 DIAGNOSIS — E43 UNSPECIFIED SEVERE PROTEIN-CALORIE MALNUTRITION: ICD-10-CM

## 2023-11-06 DIAGNOSIS — M54.9 DORSALGIA, UNSPECIFIED: ICD-10-CM

## 2023-11-06 DIAGNOSIS — E78.5 HYPERLIPIDEMIA, UNSPECIFIED: ICD-10-CM

## 2023-11-06 DIAGNOSIS — K29.80 DUODENITIS WITHOUT BLEEDING: ICD-10-CM

## 2023-11-06 DIAGNOSIS — Z90.49 ACQUIRED ABSENCE OF OTHER SPECIFIED PARTS OF DIGESTIVE TRACT: Chronic | ICD-10-CM

## 2023-11-06 DIAGNOSIS — I10 ESSENTIAL (PRIMARY) HYPERTENSION: ICD-10-CM

## 2023-11-06 DIAGNOSIS — K57.30 DIVERTICULOSIS OF LARGE INTESTINE WITHOUT PERFORATION OR ABSCESS WITHOUT BLEEDING: ICD-10-CM

## 2023-11-06 DIAGNOSIS — F32.A DEPRESSION, UNSPECIFIED: ICD-10-CM

## 2023-11-06 DIAGNOSIS — K50.90 CROHN'S DISEASE, UNSPECIFIED, WITHOUT COMPLICATIONS: ICD-10-CM

## 2023-11-06 DIAGNOSIS — R10.9 UNSPECIFIED ABDOMINAL PAIN: ICD-10-CM

## 2023-11-06 DIAGNOSIS — R53.81 OTHER MALAISE: ICD-10-CM

## 2023-11-06 DIAGNOSIS — Z29.9 ENCOUNTER FOR PROPHYLACTIC MEASURES, UNSPECIFIED: ICD-10-CM

## 2023-11-06 DIAGNOSIS — G89.29 OTHER CHRONIC PAIN: ICD-10-CM

## 2023-11-06 DIAGNOSIS — Z66 DO NOT RESUSCITATE: ICD-10-CM

## 2023-11-06 DIAGNOSIS — D32.9 BENIGN NEOPLASM OF MENINGES, UNSPECIFIED: ICD-10-CM

## 2023-11-06 DIAGNOSIS — Z79.899 OTHER LONG TERM (CURRENT) DRUG THERAPY: ICD-10-CM

## 2023-11-06 DIAGNOSIS — K64.8 OTHER HEMORRHOIDS: ICD-10-CM

## 2023-11-06 DIAGNOSIS — R13.10 DYSPHAGIA, UNSPECIFIED: ICD-10-CM

## 2023-11-06 DIAGNOSIS — K44.0 DIAPHRAGMATIC HERNIA WITH OBSTRUCTION, WITHOUT GANGRENE: ICD-10-CM

## 2023-11-06 LAB
ALBUMIN SERPL ELPH-MCNC: 4.1 G/DL — SIGNIFICANT CHANGE UP (ref 3.3–5)
ALBUMIN SERPL ELPH-MCNC: 4.1 G/DL — SIGNIFICANT CHANGE UP (ref 3.3–5)
ALP SERPL-CCNC: 58 U/L — SIGNIFICANT CHANGE UP (ref 40–120)
ALP SERPL-CCNC: 58 U/L — SIGNIFICANT CHANGE UP (ref 40–120)
ALT FLD-CCNC: 11 U/L — SIGNIFICANT CHANGE UP (ref 10–45)
ALT FLD-CCNC: 11 U/L — SIGNIFICANT CHANGE UP (ref 10–45)
ANION GAP SERPL CALC-SCNC: 10 MMOL/L — SIGNIFICANT CHANGE UP (ref 5–17)
ANION GAP SERPL CALC-SCNC: 10 MMOL/L — SIGNIFICANT CHANGE UP (ref 5–17)
APTT BLD: 29.1 SEC — SIGNIFICANT CHANGE UP (ref 24.5–35.6)
APTT BLD: 29.1 SEC — SIGNIFICANT CHANGE UP (ref 24.5–35.6)
AST SERPL-CCNC: 30 U/L — SIGNIFICANT CHANGE UP (ref 10–40)
AST SERPL-CCNC: 30 U/L — SIGNIFICANT CHANGE UP (ref 10–40)
BASOPHILS # BLD AUTO: 0.04 K/UL — SIGNIFICANT CHANGE UP (ref 0–0.2)
BASOPHILS # BLD AUTO: 0.04 K/UL — SIGNIFICANT CHANGE UP (ref 0–0.2)
BASOPHILS NFR BLD AUTO: 0.9 % — SIGNIFICANT CHANGE UP (ref 0–2)
BASOPHILS NFR BLD AUTO: 0.9 % — SIGNIFICANT CHANGE UP (ref 0–2)
BILIRUB SERPL-MCNC: 0.2 MG/DL — SIGNIFICANT CHANGE UP (ref 0.2–1.2)
BILIRUB SERPL-MCNC: 0.2 MG/DL — SIGNIFICANT CHANGE UP (ref 0.2–1.2)
BUN SERPL-MCNC: 17 MG/DL — SIGNIFICANT CHANGE UP (ref 7–23)
BUN SERPL-MCNC: 17 MG/DL — SIGNIFICANT CHANGE UP (ref 7–23)
CALCIUM SERPL-MCNC: 9.5 MG/DL — SIGNIFICANT CHANGE UP (ref 8.4–10.5)
CALCIUM SERPL-MCNC: 9.5 MG/DL — SIGNIFICANT CHANGE UP (ref 8.4–10.5)
CHLORIDE SERPL-SCNC: 106 MMOL/L — SIGNIFICANT CHANGE UP (ref 96–108)
CHLORIDE SERPL-SCNC: 106 MMOL/L — SIGNIFICANT CHANGE UP (ref 96–108)
CO2 SERPL-SCNC: 24 MMOL/L — SIGNIFICANT CHANGE UP (ref 22–31)
CO2 SERPL-SCNC: 24 MMOL/L — SIGNIFICANT CHANGE UP (ref 22–31)
CREAT SERPL-MCNC: 0.93 MG/DL — SIGNIFICANT CHANGE UP (ref 0.5–1.3)
CREAT SERPL-MCNC: 0.93 MG/DL — SIGNIFICANT CHANGE UP (ref 0.5–1.3)
CRP SERPL-MCNC: <3 MG/L — SIGNIFICANT CHANGE UP (ref 0–4)
CRP SERPL-MCNC: <3 MG/L — SIGNIFICANT CHANGE UP (ref 0–4)
EGFR: 57 ML/MIN/1.73M2 — LOW
EGFR: 57 ML/MIN/1.73M2 — LOW
EOSINOPHIL # BLD AUTO: 0.15 K/UL — SIGNIFICANT CHANGE UP (ref 0–0.5)
EOSINOPHIL # BLD AUTO: 0.15 K/UL — SIGNIFICANT CHANGE UP (ref 0–0.5)
EOSINOPHIL NFR BLD AUTO: 3.5 % — SIGNIFICANT CHANGE UP (ref 0–6)
EOSINOPHIL NFR BLD AUTO: 3.5 % — SIGNIFICANT CHANGE UP (ref 0–6)
ERYTHROCYTE [SEDIMENTATION RATE] IN BLOOD: 11 MM/HR — SIGNIFICANT CHANGE UP
ERYTHROCYTE [SEDIMENTATION RATE] IN BLOOD: 11 MM/HR — SIGNIFICANT CHANGE UP
GLUCOSE SERPL-MCNC: 127 MG/DL — HIGH (ref 70–99)
GLUCOSE SERPL-MCNC: 127 MG/DL — HIGH (ref 70–99)
HCT VFR BLD CALC: 35.3 % — SIGNIFICANT CHANGE UP (ref 34.5–45)
HCT VFR BLD CALC: 35.3 % — SIGNIFICANT CHANGE UP (ref 34.5–45)
HGB BLD-MCNC: 12.2 G/DL — SIGNIFICANT CHANGE UP (ref 11.5–15.5)
HGB BLD-MCNC: 12.2 G/DL — SIGNIFICANT CHANGE UP (ref 11.5–15.5)
IMM GRANULOCYTES NFR BLD AUTO: 0.2 % — SIGNIFICANT CHANGE UP (ref 0–0.9)
IMM GRANULOCYTES NFR BLD AUTO: 0.2 % — SIGNIFICANT CHANGE UP (ref 0–0.9)
INR BLD: 0.89 — SIGNIFICANT CHANGE UP (ref 0.85–1.18)
INR BLD: 0.89 — SIGNIFICANT CHANGE UP (ref 0.85–1.18)
LYMPHOCYTES # BLD AUTO: 1.68 K/UL — SIGNIFICANT CHANGE UP (ref 1–3.3)
LYMPHOCYTES # BLD AUTO: 1.68 K/UL — SIGNIFICANT CHANGE UP (ref 1–3.3)
LYMPHOCYTES # BLD AUTO: 39.3 % — SIGNIFICANT CHANGE UP (ref 13–44)
LYMPHOCYTES # BLD AUTO: 39.3 % — SIGNIFICANT CHANGE UP (ref 13–44)
MAGNESIUM SERPL-MCNC: 2.1 MG/DL — SIGNIFICANT CHANGE UP (ref 1.6–2.6)
MAGNESIUM SERPL-MCNC: 2.1 MG/DL — SIGNIFICANT CHANGE UP (ref 1.6–2.6)
MCHC RBC-ENTMCNC: 31.9 PG — SIGNIFICANT CHANGE UP (ref 27–34)
MCHC RBC-ENTMCNC: 31.9 PG — SIGNIFICANT CHANGE UP (ref 27–34)
MCHC RBC-ENTMCNC: 34.6 GM/DL — SIGNIFICANT CHANGE UP (ref 32–36)
MCHC RBC-ENTMCNC: 34.6 GM/DL — SIGNIFICANT CHANGE UP (ref 32–36)
MCV RBC AUTO: 92.2 FL — SIGNIFICANT CHANGE UP (ref 80–100)
MCV RBC AUTO: 92.2 FL — SIGNIFICANT CHANGE UP (ref 80–100)
MONOCYTES # BLD AUTO: 0.42 K/UL — SIGNIFICANT CHANGE UP (ref 0–0.9)
MONOCYTES # BLD AUTO: 0.42 K/UL — SIGNIFICANT CHANGE UP (ref 0–0.9)
MONOCYTES NFR BLD AUTO: 9.8 % — SIGNIFICANT CHANGE UP (ref 2–14)
MONOCYTES NFR BLD AUTO: 9.8 % — SIGNIFICANT CHANGE UP (ref 2–14)
NEUTROPHILS # BLD AUTO: 1.97 K/UL — SIGNIFICANT CHANGE UP (ref 1.8–7.4)
NEUTROPHILS # BLD AUTO: 1.97 K/UL — SIGNIFICANT CHANGE UP (ref 1.8–7.4)
NEUTROPHILS NFR BLD AUTO: 46.3 % — SIGNIFICANT CHANGE UP (ref 43–77)
NEUTROPHILS NFR BLD AUTO: 46.3 % — SIGNIFICANT CHANGE UP (ref 43–77)
NRBC # BLD: 0 /100 WBCS — SIGNIFICANT CHANGE UP (ref 0–0)
NRBC # BLD: 0 /100 WBCS — SIGNIFICANT CHANGE UP (ref 0–0)
PHOSPHATE SERPL-MCNC: 3.5 MG/DL — SIGNIFICANT CHANGE UP (ref 2.5–4.5)
PHOSPHATE SERPL-MCNC: 3.5 MG/DL — SIGNIFICANT CHANGE UP (ref 2.5–4.5)
PLATELET # BLD AUTO: 130 K/UL — LOW (ref 150–400)
PLATELET # BLD AUTO: 130 K/UL — LOW (ref 150–400)
POTASSIUM SERPL-MCNC: 4.6 MMOL/L — SIGNIFICANT CHANGE UP (ref 3.5–5.3)
POTASSIUM SERPL-MCNC: 4.6 MMOL/L — SIGNIFICANT CHANGE UP (ref 3.5–5.3)
POTASSIUM SERPL-SCNC: 4.6 MMOL/L — SIGNIFICANT CHANGE UP (ref 3.5–5.3)
POTASSIUM SERPL-SCNC: 4.6 MMOL/L — SIGNIFICANT CHANGE UP (ref 3.5–5.3)
PROT SERPL-MCNC: 6.9 G/DL — SIGNIFICANT CHANGE UP (ref 6–8.3)
PROT SERPL-MCNC: 6.9 G/DL — SIGNIFICANT CHANGE UP (ref 6–8.3)
PROTHROM AB SERPL-ACNC: 10.2 SEC — SIGNIFICANT CHANGE UP (ref 9.5–13)
PROTHROM AB SERPL-ACNC: 10.2 SEC — SIGNIFICANT CHANGE UP (ref 9.5–13)
RBC # BLD: 3.83 M/UL — SIGNIFICANT CHANGE UP (ref 3.8–5.2)
RBC # BLD: 3.83 M/UL — SIGNIFICANT CHANGE UP (ref 3.8–5.2)
RBC # FLD: 13.6 % — SIGNIFICANT CHANGE UP (ref 10.3–14.5)
RBC # FLD: 13.6 % — SIGNIFICANT CHANGE UP (ref 10.3–14.5)
SODIUM SERPL-SCNC: 140 MMOL/L — SIGNIFICANT CHANGE UP (ref 135–145)
SODIUM SERPL-SCNC: 140 MMOL/L — SIGNIFICANT CHANGE UP (ref 135–145)
WBC # BLD: 4.27 K/UL — SIGNIFICANT CHANGE UP (ref 3.8–10.5)
WBC # BLD: 4.27 K/UL — SIGNIFICANT CHANGE UP (ref 3.8–10.5)
WBC # FLD AUTO: 4.27 K/UL — SIGNIFICANT CHANGE UP (ref 3.8–10.5)
WBC # FLD AUTO: 4.27 K/UL — SIGNIFICANT CHANGE UP (ref 3.8–10.5)

## 2023-11-06 PROCEDURE — 99223 1ST HOSP IP/OBS HIGH 75: CPT | Mod: GC

## 2023-11-06 RX ORDER — SENNA PLUS 8.6 MG/1
1 TABLET ORAL EVERY 24 HOURS
Refills: 0 | Status: DISCONTINUED | OUTPATIENT
Start: 2023-11-07 | End: 2023-11-09

## 2023-11-06 RX ORDER — INFLUENZA VIRUS VACCINE 15; 15; 15; 15 UG/.5ML; UG/.5ML; UG/.5ML; UG/.5ML
0.7 SUSPENSION INTRAMUSCULAR ONCE
Refills: 0 | Status: DISCONTINUED | OUTPATIENT
Start: 2023-11-06 | End: 2023-11-09

## 2023-11-06 RX ORDER — METOPROLOL TARTRATE 50 MG
50 TABLET ORAL EVERY 24 HOURS
Refills: 0 | Status: DISCONTINUED | OUTPATIENT
Start: 2023-11-07 | End: 2023-11-09

## 2023-11-06 RX ORDER — OXYCODONE AND ACETAMINOPHEN 5; 325 MG/1; MG/1
1 TABLET ORAL
Refills: 0 | DISCHARGE

## 2023-11-06 RX ORDER — ONDANSETRON 8 MG/1
4 TABLET, FILM COATED ORAL EVERY 8 HOURS
Refills: 0 | Status: DISCONTINUED | OUTPATIENT
Start: 2023-11-06 | End: 2023-11-09

## 2023-11-06 RX ORDER — FELODIPINE 5 MG/1
5 TABLET, FILM COATED, EXTENDED RELEASE ORAL
Qty: 0 | Refills: 0 | DISCHARGE

## 2023-11-06 RX ORDER — LOSARTAN POTASSIUM 100 MG/1
50 TABLET, FILM COATED ORAL EVERY 24 HOURS
Refills: 0 | Status: DISCONTINUED | OUTPATIENT
Start: 2023-11-07 | End: 2023-11-09

## 2023-11-06 RX ORDER — METOCLOPRAMIDE HCL 10 MG
5 TABLET ORAL EVERY 12 HOURS
Refills: 0 | Status: DISCONTINUED | OUTPATIENT
Start: 2023-11-06 | End: 2023-11-09

## 2023-11-06 RX ORDER — LANOLIN ALCOHOL/MO/W.PET/CERES
3 CREAM (GRAM) TOPICAL AT BEDTIME
Refills: 0 | Status: DISCONTINUED | OUTPATIENT
Start: 2023-11-06 | End: 2023-11-09

## 2023-11-06 RX ORDER — PANTOPRAZOLE SODIUM 20 MG/1
40 TABLET, DELAYED RELEASE ORAL
Refills: 0 | Status: DISCONTINUED | OUTPATIENT
Start: 2023-11-06 | End: 2023-11-08

## 2023-11-06 RX ORDER — HYDRALAZINE HCL 50 MG
10 TABLET ORAL EVERY 8 HOURS
Refills: 0 | Status: DISCONTINUED | OUTPATIENT
Start: 2023-11-06 | End: 2023-11-09

## 2023-11-06 RX ORDER — METOPROLOL TARTRATE 50 MG
1 TABLET ORAL
Qty: 0 | Refills: 0 | DISCHARGE

## 2023-11-06 RX ORDER — ATORVASTATIN CALCIUM 80 MG/1
40 TABLET, FILM COATED ORAL AT BEDTIME
Refills: 0 | Status: DISCONTINUED | OUTPATIENT
Start: 2023-11-06 | End: 2023-11-09

## 2023-11-06 RX ORDER — SIMVASTATIN 20 MG/1
1 TABLET, FILM COATED ORAL
Refills: 0 | DISCHARGE

## 2023-11-06 RX ORDER — PANTOPRAZOLE SODIUM 20 MG/1
1 TABLET, DELAYED RELEASE ORAL
Refills: 0 | DISCHARGE

## 2023-11-06 RX ORDER — POLYETHYLENE GLYCOL 3350 17 G/17G
17 POWDER, FOR SOLUTION ORAL EVERY 24 HOURS
Refills: 0 | Status: DISCONTINUED | OUTPATIENT
Start: 2023-11-07 | End: 2023-11-09

## 2023-11-06 RX ORDER — LOSARTAN POTASSIUM 100 MG/1
1 TABLET, FILM COATED ORAL
Refills: 0 | DISCHARGE

## 2023-11-06 RX ADMIN — Medication 5 MILLIGRAM(S): at 21:15

## 2023-11-06 RX ADMIN — Medication 10 MILLIGRAM(S): at 21:34

## 2023-11-06 RX ADMIN — ATORVASTATIN CALCIUM 40 MILLIGRAM(S): 80 TABLET, FILM COATED ORAL at 21:14

## 2023-11-06 RX ADMIN — Medication 2 MILLIGRAM(S): at 21:32

## 2023-11-06 NOTE — PATIENT PROFILE ADULT - PUBLIC BENEFITS
Would recommend Dr Wilson Buchanan for ophthamology and to see sports medicine for right knee pain - Dr Shemar Stephens, please notify pt no

## 2023-11-06 NOTE — H&P ADULT - PROBLEM SELECTOR PLAN 6
F: PO  E: replete PRN  N: dash  DVT ppx: lovenox  GI PPx: PPI Endorses taking 2mg Ativan daily    -C/w 2mg Ativan  - ISTOP

## 2023-11-06 NOTE — H&P ADULT - PROBLEM SELECTOR PLAN 3
- c/w losartan 50mg, toprol 50mg, hydral 10mg TID 20 year hx. Complains of localized constant occipital headaches for the last year. Neuro exam WNL. CN II-XII grossly intact except for the presence of asymmetric facial sensation diminished on the R    - CT-head to evaluate for increasing size  - Pain control as above

## 2023-11-06 NOTE — H&P ADULT - PROBLEM SELECTOR PLAN 1
Has hx of Crohn's dz. Pain has been present for months. Having BMs, no diarrhea, constipation. Pain predominantly in the RLQ, LLQ, and LUQ.     -Percocet 4x daily  -Zofran 4mg PRN q6  -c/w home PPI  -CT Abd to r/o acute flair Has hx of Crohn's dz. Pain has been present for months. Having BMs, no diarrhea, constipation or bloody BMs. Pain predominantly in the RLQ, LLQ, and LUQ. DDx: Colitis vs constipation vs gastric ulcer vs less likely gastroenteritis    -Zofran 4mg PRN q6  -c/w home PPI  -CT Abd to r/o acute flair Has hx of Crohn's dz. Pain has been present for months. Having BMs, no diarrhea, constipation or bloody BMs. Pain predominantly in the RLQ, LLQ, and LUQ. DDx: Colitis vs constipation vs gastric ulcer vs less likely gastroenteritis    -Zofran 4mg PRN q6  -c/w home PPI  -CT Abd to r/o acute flair  -Consider GI consult if pain persists  -Collateral from PCP Dr. Benitez Birch in AM Has hx of Crohn's dz. Pain has been present for months. Having BMs, no diarrhea, constipation or bloody BMs. Pain predominantly in the RLQ, LLQ, and LUQ. DDx: Colitis vs constipation vs gastric ulcer vs less likely gastroenteritis    -Zofran 4mg PRN q6  -c/w home PPI  -CT Abd to r/o acute flair vs malignancy   -Outpt GI f/up   -Collateral from PCP Dr. Benitez Birch in AM

## 2023-11-06 NOTE — H&P ADULT - NSHPLABSRESULTS_GEN_ALL_CORE
.  LABS:                         12.2   4.27  )-----------( 130      ( 06 Nov 2023 19:12 )             35.3     11-06    140  |  106  |  17  ----------------------------<  127<H>  4.6   |  24  |  0.93    Ca    9.5      06 Nov 2023 19:12  Phos  3.5     11-06  Mg     2.1     11-06      PT/INR - ( 06 Nov 2023 19:12 )   PT: 10.2 sec;   INR: 0.89          PTT - ( 06 Nov 2023 19:12 )  PTT:29.1 sec  Urinalysis Basic - ( 06 Nov 2023 19:12 )    Color: x / Appearance: x / SG: x / pH: x  Gluc: 127 mg/dL / Ketone: x  / Bili: x / Urobili: x   Blood: x / Protein: x / Nitrite: x   Leuk Esterase: x / RBC: x / WBC x   Sq Epi: x / Non Sq Epi: x / Bacteria: x                RADIOLOGY, EKG & ADDITIONAL TESTS: Reviewed.

## 2023-11-06 NOTE — H&P ADULT - ATTENDING COMMENTS
#Abd pain: directly admitted for evaluation of chronic abd pain and weight loss. Sent by pcp Dr. Acuña, Abd is soft, mild b/l lower quadrant tenderness, no rebound, no leukocytosis. Denies diarrhea, low c/f chrons flare. last BM yesterday. F/up esr/crp, CTAP. Serial exams.

## 2023-11-06 NOTE — H&P ADULT - NSHPREVIEWOFSYSTEMS_GEN_ALL_CORE
REVIEW OF SYSTEMS:    CONSTITUTIONAL:  Positive for weakness. No fevers or chills  EYES/ENT:  No visual changes;  No vertigo or throat pain   NECK:  No pain or stiffness  RESPIRATORY:  No cough, wheezing, hemoptysis; No shortness of breath  CARDIOVASCULAR:  No chest pain or palpitations  GASTROINTESTINAL:  Positive for abdominal pain and nausea. She also endorses spontaneous intermittent vomiting, w/o hematemesis; No diarrhea or constipation. No melena or hematochezia.  GENITOURINARY:  Positive dysuria and frequency w/o hematuria  MUSCULOSKELETAL:  FROM all extremities, normal strength, No calf tenderness  NEUROLOGICAL:  No numbness or weakness  SKIN:  No itching, rashes REVIEW OF SYSTEMS:    CONSTITUTIONAL:  Positive for weakness. No fevers or chills  EYES/ENT:  No visual changes;  No vertigo or throat pain   NECK:  No pain or stiffness  RESPIRATORY:  No cough, wheezing, hemoptysis; No shortness of breath  CARDIOVASCULAR:  No chest pain or palpitations  GASTROINTESTINAL:  Positive for abdominal pain and nausea. She also endorses spontaneous intermittent vomiting, w/o hematemesis; No diarrhea or constipation. No melena or hematochezia.  GENITOURINARY:  Positive dysuria and frequency w/o hematuria  MUSCULOSKELETAL:  Decreased range of motion in all extremities, decreased strength, No calf tenderness  NEUROLOGICAL:  Diffuse weakness. Decreased sensation on the R side of the face  SKIN:  No itching, rashes

## 2023-11-06 NOTE — H&P ADULT - ASSESSMENT
89F poor historian PMHx HTN, Meningioma, Crohn's, presents for abdominal pain going on for the past 7 months, HA for the past year, and back pain since the surgery 4 years ago. Admitted for pain control and r/o Chron's flair

## 2023-11-06 NOTE — H&P ADULT - HISTORY OF PRESENT ILLNESS
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 The pt notes that she is extremely nauseas and has trouble keeping down food, and has subsequently lost 30lbs over the past year and notes significant fatigue. She states that she has has several benign brain tumors that cause localized occipital HA that are constant and worse in the morning. She denies SOB, cough, CP 89F PMHx brain tumors, Chron's disease in remission for many years, HTN, Macular degeneration p/w 7 mo. cramping abd pain that is waxing and waning 5/10 intensity that starts in her RLQ and radiates to her LLQ. The pt notes that she is extremely nauseas and has trouble keeping down food, and has subsequently lost 30lbs over the past year and notes significant fatigue. She states that she has a 20 year hx of several benign brain tumors that cause localized occipital HA that are constant and worse in the morning. She notes that she has experienced dysuria for the past few days. She denies fevers, light headedness, SOB, cough, or CP. 89F PMHx Meningioma, 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 cramping abd pain that is waxing and waning 5/10 intensity that starts in her RLQ and radiates to her LLQ. The pt notes that she is extremely nauseas and has trouble keeping down food, and has subsequently lost 30lbs over the past year and notes significant fatigue. She states that she has a 20 year hx of several benign meningiomas that cause localized occipital HA that are constant and worse in the morning. She complains of 7/10 back pain that is constant since a back surgery 4 years prior. She notes that she has experienced dysuria for the past few days. She denies fevers, light headedness, SOB, cough, or CP.

## 2023-11-06 NOTE — H&P ADULT - PROBLEM SELECTOR PLAN 2
20 year hx. Complains of localized constant occipital headaches for the last year. Neuro exam WNL. CN II-XII grossly intact except for the presence of asymmetric facial sensation diminished on the R    - CT-head to evaluate for increasing size?  - Pain control as above Major back surgery in 2018. Pt has had consistent pain since then managed by PCP.    - ISTOP  - C/w home Percocet q6  - Senna and Miralax for Bowel regimen

## 2023-11-06 NOTE — H&P ADULT - PROBLEM SELECTOR PLAN 7
F: PO  E: replete PRN  N: dash  DVT ppx: lovenox  GI PPx: PPI F: PO  E: replete PRN  N: dash  DVT ppx: lovenox if CTH negative, SCDs  GI PPx: PPI

## 2023-11-06 NOTE — H&P ADULT - NSHPPHYSICALEXAM_GEN_ALL_CORE
T(C): 36.4 (11-06-23 @ 17:49), Max: 36.4 (11-06-23 @ 17:49)  HR: 63 (11-06-23 @ 17:49) (63 - 63)  BP: 176/74 (11-06-23 @ 17:49) (176/74 - 176/74)  RR: 18 (11-06-23 @ 17:49) (18 - 18)  SpO2: 97% (11-06-23 @ 17:49) (97% - 97%)    CONSTITUTIONAL: Well groomed, no apparent distress  EYES: PERRLA and symmetric, EOMI, No conjunctival or scleral injection, non-icteric  ENMT: Oral mucosa with moist membranes. Normal dentition; no pharyngeal injection or exudates             NECK: Supple, symmetric and without tracheal deviation   RESP: No respiratory distress, no use of accessory muscles; CTA b/l, no WRR  CV: RRR, +S1S2, no MRG; no JVD; no peripheral edema  GI: Soft, NT, ND, no rebound, no guarding; no palpable masses; no hepatosplenomegaly; no hernia palpated  LYMPH: No cervical LAD or tenderness; no axillary LAD or tenderness; no inguinal LAD or tenderness  MSK: Normal gait; No digital clubbing or cyanosis; examination of the (head/neck/spine/ribs/pelvis, RUE, LUE, RLE, LLE) without misalignment,            Normal ROM without pain, no spinal tenderness, normal muscle strength/tone  SKIN: No rashes or ulcers noted; no subcutaneous nodules or induration palpable  NEURO: CN II-XII intact; normal reflexes in upper and lower extremities, sensation intact in upper and lower extremities b/l to light touch   PSYCH: Appropriate insight/judgment; A+O x 3, mood and affect appropriate, recent/remote memory intact T(C): 36.4 (11-06-23 @ 17:49), Max: 36.4 (11-06-23 @ 17:49)  HR: 63 (11-06-23 @ 17:49) (63 - 63)  BP: 176/74 (11-06-23 @ 17:49) (176/74 - 176/74)  RR: 18 (11-06-23 @ 17:49) (18 - 18)  SpO2: 97% (11-06-23 @ 17:49) (97% - 97%)    CONSTITUTIONAL: Well groomed, no apparent distress  EYES: PERRLA and symmetric, EOMI but slowed, No conjunctival or scleral injection, non-icteric  ENMT: Oral mucosa with moist membranes. Dentures present; no pharyngeal injection or exudates  RESP: No respiratory distress, no use of accessory muscles; CTA b/l, no WRR  CV: RRR, +S1S2, no MRG; no JVD; no peripheral edema  GI: TTP in the RLQ, LUQ, and LLQ. Soft, ND, no rebound, no guarding; no palpable masses; no hepatosplenomegaly; no hernia palpated  SKIN: No rashes or ulcers noted; no subcutaneous nodules or induration palpable  NEURO: CN II-XII intact, decreased facial sensation on the R compared to the L, sensation intact in upper and lower extremities b/l to light touch   PSYCH: Appropriate insight/judgment; A+O x 3, mood and affect appropriate, recent/remote memory intact

## 2023-11-07 ENCOUNTER — TRANSCRIPTION ENCOUNTER (OUTPATIENT)
Age: 88
End: 2023-11-07

## 2023-11-07 LAB
ALBUMIN SERPL ELPH-MCNC: 3.1 G/DL — LOW (ref 3.3–5)
ALBUMIN SERPL ELPH-MCNC: 3.1 G/DL — LOW (ref 3.3–5)
ALBUMIN SERPL ELPH-MCNC: 3.2 G/DL — LOW (ref 3.3–5)
ALBUMIN SERPL ELPH-MCNC: 3.2 G/DL — LOW (ref 3.3–5)
ALP SERPL-CCNC: 48 U/L — SIGNIFICANT CHANGE UP (ref 40–120)
ALP SERPL-CCNC: 48 U/L — SIGNIFICANT CHANGE UP (ref 40–120)
ALP SERPL-CCNC: 49 U/L — SIGNIFICANT CHANGE UP (ref 40–120)
ALP SERPL-CCNC: 49 U/L — SIGNIFICANT CHANGE UP (ref 40–120)
ALT FLD-CCNC: 8 U/L — LOW (ref 10–45)
ALT FLD-CCNC: 8 U/L — LOW (ref 10–45)
ALT FLD-CCNC: 9 U/L — LOW (ref 10–45)
ALT FLD-CCNC: 9 U/L — LOW (ref 10–45)
ANION GAP SERPL CALC-SCNC: 12 MMOL/L — SIGNIFICANT CHANGE UP (ref 5–17)
ANION GAP SERPL CALC-SCNC: 12 MMOL/L — SIGNIFICANT CHANGE UP (ref 5–17)
ANION GAP SERPL CALC-SCNC: 9 MMOL/L — SIGNIFICANT CHANGE UP (ref 5–17)
ANION GAP SERPL CALC-SCNC: 9 MMOL/L — SIGNIFICANT CHANGE UP (ref 5–17)
AST SERPL-CCNC: 15 U/L — SIGNIFICANT CHANGE UP (ref 10–40)
AST SERPL-CCNC: 15 U/L — SIGNIFICANT CHANGE UP (ref 10–40)
AST SERPL-CCNC: 17 U/L — SIGNIFICANT CHANGE UP (ref 10–40)
AST SERPL-CCNC: 17 U/L — SIGNIFICANT CHANGE UP (ref 10–40)
BASOPHILS # BLD AUTO: 0.02 K/UL — SIGNIFICANT CHANGE UP (ref 0–0.2)
BASOPHILS # BLD AUTO: 0.02 K/UL — SIGNIFICANT CHANGE UP (ref 0–0.2)
BASOPHILS NFR BLD AUTO: 0.6 % — SIGNIFICANT CHANGE UP (ref 0–2)
BASOPHILS NFR BLD AUTO: 0.6 % — SIGNIFICANT CHANGE UP (ref 0–2)
BILIRUB SERPL-MCNC: 0.2 MG/DL — SIGNIFICANT CHANGE UP (ref 0.2–1.2)
BILIRUB SERPL-MCNC: 0.2 MG/DL — SIGNIFICANT CHANGE UP (ref 0.2–1.2)
BILIRUB SERPL-MCNC: <0.2 MG/DL — SIGNIFICANT CHANGE UP (ref 0.2–1.2)
BILIRUB SERPL-MCNC: <0.2 MG/DL — SIGNIFICANT CHANGE UP (ref 0.2–1.2)
BUN SERPL-MCNC: 17 MG/DL — SIGNIFICANT CHANGE UP (ref 7–23)
CALCIUM SERPL-MCNC: 8.7 MG/DL — SIGNIFICANT CHANGE UP (ref 8.4–10.5)
CHLORIDE SERPL-SCNC: 104 MMOL/L — SIGNIFICANT CHANGE UP (ref 96–108)
CHLORIDE SERPL-SCNC: 104 MMOL/L — SIGNIFICANT CHANGE UP (ref 96–108)
CHLORIDE SERPL-SCNC: 105 MMOL/L — SIGNIFICANT CHANGE UP (ref 96–108)
CHLORIDE SERPL-SCNC: 105 MMOL/L — SIGNIFICANT CHANGE UP (ref 96–108)
CO2 SERPL-SCNC: 20 MMOL/L — LOW (ref 22–31)
CO2 SERPL-SCNC: 20 MMOL/L — LOW (ref 22–31)
CO2 SERPL-SCNC: 24 MMOL/L — SIGNIFICANT CHANGE UP (ref 22–31)
CO2 SERPL-SCNC: 24 MMOL/L — SIGNIFICANT CHANGE UP (ref 22–31)
CREAT SERPL-MCNC: 0.84 MG/DL — SIGNIFICANT CHANGE UP (ref 0.5–1.3)
CREAT SERPL-MCNC: 0.84 MG/DL — SIGNIFICANT CHANGE UP (ref 0.5–1.3)
CREAT SERPL-MCNC: 0.9 MG/DL — SIGNIFICANT CHANGE UP (ref 0.5–1.3)
CREAT SERPL-MCNC: 0.9 MG/DL — SIGNIFICANT CHANGE UP (ref 0.5–1.3)
EGFR: 60 ML/MIN/1.73M2 — SIGNIFICANT CHANGE UP
EGFR: 60 ML/MIN/1.73M2 — SIGNIFICANT CHANGE UP
EGFR: 65 ML/MIN/1.73M2 — SIGNIFICANT CHANGE UP
EGFR: 65 ML/MIN/1.73M2 — SIGNIFICANT CHANGE UP
EOSINOPHIL # BLD AUTO: 0.15 K/UL — SIGNIFICANT CHANGE UP (ref 0–0.5)
EOSINOPHIL # BLD AUTO: 0.15 K/UL — SIGNIFICANT CHANGE UP (ref 0–0.5)
EOSINOPHIL NFR BLD AUTO: 4.6 % — SIGNIFICANT CHANGE UP (ref 0–6)
EOSINOPHIL NFR BLD AUTO: 4.6 % — SIGNIFICANT CHANGE UP (ref 0–6)
GLUCOSE SERPL-MCNC: 108 MG/DL — HIGH (ref 70–99)
GLUCOSE SERPL-MCNC: 108 MG/DL — HIGH (ref 70–99)
GLUCOSE SERPL-MCNC: 127 MG/DL — HIGH (ref 70–99)
GLUCOSE SERPL-MCNC: 127 MG/DL — HIGH (ref 70–99)
HCT VFR BLD CALC: 34.1 % — LOW (ref 34.5–45)
HCT VFR BLD CALC: 34.1 % — LOW (ref 34.5–45)
HGB BLD-MCNC: 10.7 G/DL — LOW (ref 11.5–15.5)
HGB BLD-MCNC: 10.7 G/DL — LOW (ref 11.5–15.5)
IMM GRANULOCYTES NFR BLD AUTO: 0 % — SIGNIFICANT CHANGE UP (ref 0–0.9)
IMM GRANULOCYTES NFR BLD AUTO: 0 % — SIGNIFICANT CHANGE UP (ref 0–0.9)
LYMPHOCYTES # BLD AUTO: 1.53 K/UL — SIGNIFICANT CHANGE UP (ref 1–3.3)
LYMPHOCYTES # BLD AUTO: 1.53 K/UL — SIGNIFICANT CHANGE UP (ref 1–3.3)
LYMPHOCYTES # BLD AUTO: 46.8 % — HIGH (ref 13–44)
LYMPHOCYTES # BLD AUTO: 46.8 % — HIGH (ref 13–44)
MAGNESIUM SERPL-MCNC: 2 MG/DL — SIGNIFICANT CHANGE UP (ref 1.6–2.6)
MAGNESIUM SERPL-MCNC: 2 MG/DL — SIGNIFICANT CHANGE UP (ref 1.6–2.6)
MCHC RBC-ENTMCNC: 30.7 PG — SIGNIFICANT CHANGE UP (ref 27–34)
MCHC RBC-ENTMCNC: 30.7 PG — SIGNIFICANT CHANGE UP (ref 27–34)
MCHC RBC-ENTMCNC: 31.4 GM/DL — LOW (ref 32–36)
MCHC RBC-ENTMCNC: 31.4 GM/DL — LOW (ref 32–36)
MCV RBC AUTO: 97.7 FL — SIGNIFICANT CHANGE UP (ref 80–100)
MCV RBC AUTO: 97.7 FL — SIGNIFICANT CHANGE UP (ref 80–100)
MONOCYTES # BLD AUTO: 0.37 K/UL — SIGNIFICANT CHANGE UP (ref 0–0.9)
MONOCYTES # BLD AUTO: 0.37 K/UL — SIGNIFICANT CHANGE UP (ref 0–0.9)
MONOCYTES NFR BLD AUTO: 11.3 % — SIGNIFICANT CHANGE UP (ref 2–14)
MONOCYTES NFR BLD AUTO: 11.3 % — SIGNIFICANT CHANGE UP (ref 2–14)
NEUTROPHILS # BLD AUTO: 1.2 K/UL — LOW (ref 1.8–7.4)
NEUTROPHILS # BLD AUTO: 1.2 K/UL — LOW (ref 1.8–7.4)
NEUTROPHILS NFR BLD AUTO: 36.7 % — LOW (ref 43–77)
NEUTROPHILS NFR BLD AUTO: 36.7 % — LOW (ref 43–77)
NRBC # BLD: 0 /100 WBCS — SIGNIFICANT CHANGE UP (ref 0–0)
NRBC # BLD: 0 /100 WBCS — SIGNIFICANT CHANGE UP (ref 0–0)
PHOSPHATE SERPL-MCNC: 3.6 MG/DL — SIGNIFICANT CHANGE UP (ref 2.5–4.5)
PHOSPHATE SERPL-MCNC: 3.6 MG/DL — SIGNIFICANT CHANGE UP (ref 2.5–4.5)
PLATELET # BLD AUTO: 109 K/UL — LOW (ref 150–400)
PLATELET # BLD AUTO: 109 K/UL — LOW (ref 150–400)
POTASSIUM SERPL-MCNC: 3.8 MMOL/L — SIGNIFICANT CHANGE UP (ref 3.5–5.3)
POTASSIUM SERPL-MCNC: 3.8 MMOL/L — SIGNIFICANT CHANGE UP (ref 3.5–5.3)
POTASSIUM SERPL-MCNC: 4 MMOL/L — SIGNIFICANT CHANGE UP (ref 3.5–5.3)
POTASSIUM SERPL-MCNC: 4 MMOL/L — SIGNIFICANT CHANGE UP (ref 3.5–5.3)
POTASSIUM SERPL-SCNC: 3.8 MMOL/L — SIGNIFICANT CHANGE UP (ref 3.5–5.3)
POTASSIUM SERPL-SCNC: 3.8 MMOL/L — SIGNIFICANT CHANGE UP (ref 3.5–5.3)
POTASSIUM SERPL-SCNC: 4 MMOL/L — SIGNIFICANT CHANGE UP (ref 3.5–5.3)
POTASSIUM SERPL-SCNC: 4 MMOL/L — SIGNIFICANT CHANGE UP (ref 3.5–5.3)
PROT SERPL-MCNC: 5.6 G/DL — LOW (ref 6–8.3)
PROT SERPL-MCNC: 5.6 G/DL — LOW (ref 6–8.3)
PROT SERPL-MCNC: 5.7 G/DL — LOW (ref 6–8.3)
PROT SERPL-MCNC: 5.7 G/DL — LOW (ref 6–8.3)
RBC # BLD: 3.49 M/UL — LOW (ref 3.8–5.2)
RBC # BLD: 3.49 M/UL — LOW (ref 3.8–5.2)
RBC # FLD: 13.5 % — SIGNIFICANT CHANGE UP (ref 10.3–14.5)
RBC # FLD: 13.5 % — SIGNIFICANT CHANGE UP (ref 10.3–14.5)
SODIUM SERPL-SCNC: 136 MMOL/L — SIGNIFICANT CHANGE UP (ref 135–145)
SODIUM SERPL-SCNC: 136 MMOL/L — SIGNIFICANT CHANGE UP (ref 135–145)
SODIUM SERPL-SCNC: 138 MMOL/L — SIGNIFICANT CHANGE UP (ref 135–145)
SODIUM SERPL-SCNC: 138 MMOL/L — SIGNIFICANT CHANGE UP (ref 135–145)
WBC # BLD: 3.27 K/UL — LOW (ref 3.8–10.5)
WBC # BLD: 3.27 K/UL — LOW (ref 3.8–10.5)
WBC # FLD AUTO: 3.27 K/UL — LOW (ref 3.8–10.5)
WBC # FLD AUTO: 3.27 K/UL — LOW (ref 3.8–10.5)

## 2023-11-07 PROCEDURE — 99233 SBSQ HOSP IP/OBS HIGH 50: CPT | Mod: GC

## 2023-11-07 PROCEDURE — 74177 CT ABD & PELVIS W/CONTRAST: CPT | Mod: 26

## 2023-11-07 PROCEDURE — 72170 X-RAY EXAM OF PELVIS: CPT | Mod: 26

## 2023-11-07 PROCEDURE — 72080 X-RAY EXAM THORACOLMB 2/> VW: CPT | Mod: 26

## 2023-11-07 PROCEDURE — 70450 CT HEAD/BRAIN W/O DYE: CPT | Mod: 26

## 2023-11-07 PROCEDURE — 99221 1ST HOSP IP/OBS SF/LOW 40: CPT

## 2023-11-07 RX ORDER — IOHEXOL 300 MG/ML
30 INJECTION, SOLUTION INTRAVENOUS ONCE
Refills: 0 | Status: COMPLETED | OUTPATIENT
Start: 2023-11-07 | End: 2023-11-07

## 2023-11-07 RX ADMIN — Medication 10 MILLIGRAM(S): at 23:21

## 2023-11-07 RX ADMIN — IOHEXOL 30 MILLILITER(S): 300 INJECTION, SOLUTION INTRAVENOUS at 09:52

## 2023-11-07 RX ADMIN — PANTOPRAZOLE SODIUM 40 MILLIGRAM(S): 20 TABLET, DELAYED RELEASE ORAL at 06:35

## 2023-11-07 RX ADMIN — Medication 2 MILLIGRAM(S): at 23:13

## 2023-11-07 RX ADMIN — ATORVASTATIN CALCIUM 40 MILLIGRAM(S): 80 TABLET, FILM COATED ORAL at 23:12

## 2023-11-07 RX ADMIN — LOSARTAN POTASSIUM 50 MILLIGRAM(S): 100 TABLET, FILM COATED ORAL at 09:52

## 2023-11-07 RX ADMIN — Medication 10 MILLIGRAM(S): at 06:37

## 2023-11-07 RX ADMIN — Medication 10 MILLIGRAM(S): at 17:41

## 2023-11-07 RX ADMIN — Medication 5 MILLIGRAM(S): at 23:16

## 2023-11-07 RX ADMIN — Medication 5 MILLIGRAM(S): at 09:52

## 2023-11-07 RX ADMIN — Medication 50 MILLIGRAM(S): at 13:22

## 2023-11-07 NOTE — CONSULT NOTE ADULT - ASSESSMENT
89F with PMH of meningioma, Chron's disease in remission for many years, HTN, presenting with abdominal pain. GI consulted for failure to thrive, dysphagia, and unintentional weight loss. GI consulted for possible EGD.     Recommendations:   - clear liquid diet today   - NPO except medications after midnight   - plan for EGD tomorrow (discussed risks/benefits with patient including infection, bleeding, perforation, or a missed lesion)   - f/u CT abdomen/pelvis with PO and IV contrast    Case discussed with Dr. Ernandez. GI Team will continue to follow.     Leslie Roberts D.O.   Gastroenterology Fellow  Weekday 7am-5pm Pager: [phone]  Weeknights/Weekend/Holiday Coverage: Please call the  for contact info     89F with PMH of meningioma, Crohn's disease in remission for many years, HTN, presenting with abdominal pain. GI consulted for failure to thrive, dysphagia, and unintentional weight loss.    Recommendations:   - clear liquid diet today   - NPO except medications after midnight   - plan for EGD tomorrow (discussed risks/benefits with patient including infection, bleeding, perforation, or a missed lesion)   - f/u CT abdomen/pelvis with PO and IV contrast    Case discussed with Dr. Ernandez. GI Team will continue to follow.     Lselie Roberts D.O.   Gastroenterology Fellow  Weekday 7am-5pm Pager: [phone]  Weeknights/Weekend/Holiday Coverage: Please call the  for contact info

## 2023-11-07 NOTE — CHART NOTE - NSCHARTNOTEFT_GEN_A_CORE
PDI	Current Rx	Drug Type	Rx Written	Rx Dispensed	Drug	Quantity	Days Supply	Prescriber Name	Prescriber TIFFANIE #	Payment Method	Dispenser  A	Y	O	10/11/2023	11/02/2023	oxycodone-acetaminophen  mg tab	185	26	Gil Oglesby	[id number]	Medicare	[id number]  A	Y	B	10/10/2023	10/16/2023	lorazepam 2 mg tablet	120	30	Federico Whitfield M, MD	[id number]	Medicare	[facility]  A	N	O	09/23/2023	10/03/2023	oxycodone-acetaminophen  mg tab	185	26	Gil Oglesby	[id number]	Medicare	[id number]  A	N	O	08/31/2023	08/31/2023	oxycodone-acetaminophen  mg tab	185	30	Gil Oglesby	[id number]	Medicare	[id number]  A	N	B	08/04/2023	08/09/2023	lorazepam 2 mg tablet	120	30	Federico Whitfield M, MD	[id number]	Medicare	[id number]  A	N	O	07/26/2023	07/31/2023	oxycodone-acetaminophen  mg tab	185	26	Gil Oglesby	[id number]	Medicare	[id number]  A	N	O	06/26/2023	06/26/2023	oxycodone-acetaminophen  mg tab	185	27	Gil Oglesby	[id number]	Medicare	[id number]  A	N	O	05/23/2023	05/28/2023	oxycodone-acetaminophen  mg tab	185	26	Gil Oglesby	[id number]	Medicare	[id number]  A	N	B	05/17/2023	05/17/2023	lorazepam 2 mg tablet	120	30	Federico Whitfield M, MD	[id number]	Medicare	[id number]  A	N	O	04/27/2023	04/28/2023	oxycodone-acetaminophen  mg tab	185	27	Gil Oglesby	[id number]	Medicare	[id number]  A	N	O	03/29/2023	03/29/2023	oxycodone-acetaminophen  mg tab	185	27	Gil Oglesby	[id number]	Medicare	[id number]  A	N	B	03/21/2023	03/22/2023	lorazepam 2 mg tablet	120	30	Federico Whitfield M, MD	[id number]	Medicare	[id number]  A	N	O	02/27/2023	03/02/2023	oxycodone-acetaminophen  mg tab	180	30	Aston Holly	[id number]	Medicare	[id number]  A	N	O	01/23/2023	02/02/2023	oxycodone-acetaminophen  mg tab	180	30	Aston Holly	[id number]	Medicare	[id number]  A	N	B	01/26/2023	01/26/2023	lorazepam 2 mg tablet	120	30	Federico Whitfield M, MD	[id number]	Medicare	[id number]  A	N	O	01/12/2023	01/13/2023	oxycodone-acetaminophen  mg tab	120	30	Aston Holly	[id number]	Medicare	[id number]  A	N	O	12/15/2022	12/15/2022	oxycodone-acetaminophen  mg tab	120	30	Leon Olsen MD	[id number]	Medicare	[id number]  A	N	B	12/01/2022	12/12/2022	lorazepam 2 mg tablet	120	30	Federico Whitfield M, MD	[id number]	Medicare	[id number]  A	N	O	10/27/2022	11/14/2022	oxycodone-acetaminophen  mg tab	195	30	Aston Holly	[id number]	Medicare	[id number]

## 2023-11-07 NOTE — DISCHARGE NOTE PROVIDER - NSDCMRMEDTOKEN_GEN_ALL_CORE_FT
hydrALAZINE 10 mg oral tablet: 1 tab(s) orally 3 times a day  LORazepam 2 mg oral tablet: 1 tab(s) orally 2 times a day  losartan 50 mg oral tablet: 1 orally once a day  metoclopramide 5 mg oral tablet: 1 tab(s) orally 2 times a day for delayed gastric emptying.  oxycodone-acetaminophen 10 mg-325 mg oral tablet: 1 orally every 6 hours as needed for  moderate pain  Protonix 40 mg oral delayed release tablet: 1 orally once a day  simvastatin 40 mg oral tablet: 1 orally once a day (at bedtime)  Toprol-XL 50 mg oral tablet, extended release: 1 tab(s) orally once a day   hydrALAZINE 10 mg oral tablet: 1 tab(s) orally 3 times a day  LORazepam 2 mg oral tablet: 1 tab(s) orally 2 times a day  losartan 50 mg oral tablet: 1 orally once a day  metoclopramide 5 mg oral tablet: 1 tab(s) orally 2 times a day for delayed gastric emptying.  mirtazapine 7.5 mg oral tablet: 1 tab(s) orally once a day (at bedtime)  nystatin 100,000 units/g topical powder: Apply topically to affected area as needed for  itching Apply topically to affected area  oxycodone-acetaminophen 10 mg-325 mg oral tablet: 1 orally every 6 hours as needed for  moderate pain  Protonix 40 mg oral delayed release tablet: 1 orally once a day  simvastatin 40 mg oral tablet: 1 orally once a day (at bedtime)  Toprol-XL 50 mg oral tablet, extended release: 1 tab(s) orally once a day   hydrALAZINE 10 mg oral tablet: 1 tab(s) orally 3 times a day  LORazepam 2 mg oral tablet: 1 tab(s) orally 2 times a day  losartan 50 mg oral tablet: 1 orally once a day  metoclopramide 5 mg oral tablet: 1 tab(s) orally 2 times a day for delayed gastric emptying.  mirtazapine 7.5 mg oral tablet: 1 tab(s) orally once a day (at bedtime)  nystatin 100,000 units/g topical powder: Apply topically to affected area 3 times a day as needed for  itching Apply topically to affected area  oxycodone-acetaminophen 10 mg-325 mg oral tablet: 1 orally every 6 hours as needed for  moderate pain  Protonix 40 mg oral delayed release tablet: 1 orally once a day  simvastatin 40 mg oral tablet: 1 orally once a day (at bedtime)  Toprol-XL 50 mg oral tablet, extended release: 1 tab(s) orally once a day

## 2023-11-07 NOTE — CONSULT NOTE ADULT - SUBJECTIVE AND OBJECTIVE BOX
Initial GI Consult Note:     89F with PMH of meningioma, Chron's disease in remission for many years, HTN, presenting with abdominal pain. GI consulted for failure to thrive, dysphagia, and unintentional weight loss. Patient seen and examined at bedside. Patient states that she is able to eat food, but sometimes feels like it gets stuck in the distal esophagus. She often will drink water and makes sure to take very small bites of her food in order to be able to tolerate eating. Also states that she occasionally coughs when drinking liquids and must make a concerted effort to really chew her food. Previously had multiple EGD for Crohn's disease but has not had once since she started experiencing dysphagia. Patient describes having a 30 lbs unintentional weight loss over the past year as well as generalized fatigue. Per chart, patient has a 20 year history of several benign meningiomas that cause localized occipital HA that are constant and worse in the morning. GI consulted for EGD for dysphagia in the setting of unintentional weight loss.      OVERNIGHT EVENTS:    SUBJECTIVE / INTERVAL HPI: Patient seen and examined at bedside.     VITAL SIGNS:  Vital Signs Last 24 Hrs  T(C): 36.3 (07 Nov 2023 06:13), Max: 36.8 (06 Nov 2023 21:32)  T(F): 97.4 (07 Nov 2023 06:13), Max: 98.2 (06 Nov 2023 21:32)  HR: 57 (07 Nov 2023 09:38) (57 - 67)  BP: 163/67 (07 Nov 2023 09:38) (138/67 - 176/74)  BP(mean): 108 (06 Nov 2023 17:49) (108 - 108)  RR: 17 (07 Nov 2023 06:13) (17 - 18)  SpO2: 97% (07 Nov 2023 06:13) (97% - 97%)    Parameters below as of 07 Nov 2023 06:13  Patient On (Oxygen Delivery Method): room air      PHYSICAL EXAM:  General: No acute distress, thin, frail, eating a bagel at bedside   Lungs: Normal respiratory effort and no intercostal retractions  Cardiovascular: RRR  Abdomen: Soft, non-tender, non-distended  Neurological: Alert and oriented x3  Skin: Warm and dry. No obvious rash     MEDICATIONS:  MEDICATIONS  (STANDING):  atorvastatin 40 milliGRAM(s) Oral at bedtime  hydrALAZINE 10 milliGRAM(s) Oral every 8 hours  influenza  Vaccine (HIGH DOSE) 0.7 milliLiter(s) IntraMuscular once  LORazepam     Tablet 2 milliGRAM(s) Oral every 12 hours  losartan 50 milliGRAM(s) Oral every 24 hours  metoclopramide 5 milliGRAM(s) Oral every 12 hours  metoprolol succinate ER 50 milliGRAM(s) Oral every 24 hours  pantoprazole    Tablet 40 milliGRAM(s) Oral before breakfast  polyethylene glycol 3350 17 Gram(s) Oral every 24 hours  senna 1 Tablet(s) Oral every 24 hours    MEDICATIONS  (PRN):  aluminum hydroxide/magnesium hydroxide/simethicone Suspension 30 milliLiter(s) Oral every 4 hours PRN Dyspepsia  melatonin 3 milliGRAM(s) Oral at bedtime PRN Insomnia  ondansetron Injectable 4 milliGRAM(s) IV Push every 8 hours PRN Nausea and/or Vomiting  oxycodone    5 mG/acetaminophen 325 mG 1 Tablet(s) Oral every 6 hours PRN Mild Pain (1 - 3)      ALLERGIES:  Allergies    No Known Allergies    Intolerances        LABS:                        10.7   3.27  )-----------( 109      ( 07 Nov 2023 07:46 )             34.1     11-07    138  |  105  |  17  ----------------------------<  108<H>  4.0   |  24  |  0.90    Ca    8.7      07 Nov 2023 07:46  Phos  3.6     11-07  Mg     2.0     11-07    TPro  5.7<L>  /  Alb  3.2<L>  /  TBili  0.2  /  DBili  x   /  AST  15  /  ALT  8<L>  /  AlkPhos  48  11-07    PT/INR - ( 06 Nov 2023 19:12 )   PT: 10.2 sec;   INR: 0.89          PTT - ( 06 Nov 2023 19:12 )  PTT:29.1 sec  Urinalysis Basic - ( 07 Nov 2023 07:46 )    Color: x / Appearance: x / SG: x / pH: x  Gluc: 108 mg/dL / Ketone: x  / Bili: x / Urobili: x   Blood: x / Protein: x / Nitrite: x   Leuk Esterase: x / RBC: x / WBC x   Sq Epi: x / Non Sq Epi: x / Bacteria: x      CAPILLARY BLOOD GLUCOSE    RADIOLOGY & ADDITIONAL TESTS: Reviewed.

## 2023-11-07 NOTE — DISCHARGE NOTE PROVIDER - NSDCCPCAREPLAN_GEN_ALL_CORE_FT
PRINCIPAL DISCHARGE DIAGNOSIS  Diagnosis: Abdominal pain  Assessment and Plan of Treatment:      PRINCIPAL DISCHARGE DIAGNOSIS  Diagnosis: Abdominal pain  Assessment and Plan of Treatment: Pain in the abdomen (abdominal pain) can be caused by many things. Often, abdominal pain is not serious and it gets better with no treatment or by being treated at home. Watch your condition for any changes. Your endoscopy has shown erosive gastritis. Your colonoscopy has shown erosions in the terminal ileum for which biopsies were taken. We recommend you continue to follow up with Dr. Neely and follow up with Dr. Taran Ernandez for continued management. The office of Dr. Ernandez will reach you to set an appointment.

## 2023-11-07 NOTE — DISCHARGE NOTE PROVIDER - NSDCFUADDAPPT_GEN_ALL_CORE_FT
Dr. Neely will call you to make a follow up appointment within 1 week.  Dr. Neely will call you to make a follow up appointment within 1 week.     Please make a follow up appoint with the gastroenterologist Maximo Franz within 1 week fo discharge. Please call Phone: [phone] to make an appointment.

## 2023-11-07 NOTE — DISCHARGE NOTE PROVIDER - PROVIDER TOKENS
PROVIDER:[TOKEN:[[id number]],FOLLOWUP:[1 week]] PROVIDER:[TOKEN:[[id number]],FOLLOWUP:[1 week]],PROVIDER:[TOKEN:[[id number]],FOLLOWUP:[1 week]]

## 2023-11-07 NOTE — DISCHARGE NOTE PROVIDER - NSDCCPTREATMENT_GEN_ALL_CORE_FT
PRINCIPAL PROCEDURE  Procedure: Colonoscopy  Findings and Treatment:       SECONDARY PROCEDURE  Procedure: EGD  Findings and Treatment: Please repeat EGD in 2 months   Findings are as follows:   - A sliding medium size hiatal hernia was seen, the esophagogastric junction(EGJ) was noted at 31 cm, with hiatal narrowing at 34 cm from the incisors. Retroflexion view in the stomach confirmed the size and morphology of the hernia.  - Grade C esophagitis with no bleeding was seen in the GE junction, compatible with nonspecific erosive esophagitis.  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 The varices were not bleeding.  - Diffuse erythema of the mucosa with no bleeding was noted in the stomach. These findings are compatible with erosive gastritis. Multiple cold forceps biopsies were performed for H. pylori in the antrum, lesser curvature and greater curvature of the stomach.  - Erythema of the mucosa with no bleeding was noted in the duodenum. These findings are compatible with duodenitis. Multiple cold forceps biopsies were performed for celiac disease in the duodenal bulb and second part of the duodenum.    Procedure: CT abdomen pelvis  Findings and Treatment: IMPRESSION:  1.  Multifocal thickening of colon involving the cecum, ascending colon,   descending colon, and sigmoid colon, possibly representing skip lesions   given history of Crohn's disease. Longest segment of colitis involving   the descending and sigmoid colon.  2.  Focal circumferential wall thickening of the ascending colon with   luminal narrowing. While this could represent sequela of chronic   inflammation, underlying neoplasm is not excluded. Consider follow-up   with colonoscopy if not recently performed.  3.  Small nodules at the lung bases, some of which are new compared with   the prior CT 6/27/2023, possibly infectious or inflammatory in etiology.  4.  Other findings as above without significant interval change from   prior CT 6/27/2023      Procedure: CT, head  Findings and Treatment: IMPRESSION  1.  No evidence of acute intracranial hemorrhage or midline shift.  2.  Soft tissue lesion anterior to the left cerebellar hemisphere as   discussed above. This could represent a small meningioma. Other   etiologies are not excluded. Recommend MRI of the brain for further   evaluation.  3.  Probable calcified meningioma along the left frontal convexity.  4.  Chronic small vessel disease with chronic appearing infarcts as   discussed above.       PRINCIPAL PROCEDURE  Procedure: Colonoscopy  Findings and Treatment: Impressions:  	Erosions in the terminal ileum. (Biopsy).  	Mild erythema in the cecum, ascending colon and proximal transverse colon compatible with indeterminate colitis. (Biopsy).  	Polyp (3 mm) in the rectum. (Polypectomy).  	Polyp (2 mm) in the sigmoid colon.  	Mild diverticulosis of the sigmoid colon.  	Grade/Stage I internal hemorrhoids.      SECONDARY PROCEDURE  Procedure: EGD  Findings and Treatment: Please repeat EGD in 2 months   Findings are as follows:   - A sliding medium size hiatal hernia was seen, the esophagogastric junction(EGJ) was noted at 31 cm, with hiatal narrowing at 34 cm from the incisors. Retroflexion view in the stomach confirmed the size and morphology of the hernia.  - Grade C esophagitis with no bleeding was seen in the GE junction, compatible with nonspecific erosive esophagitis.  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 The varices were not bleeding.  - Diffuse erythema of the mucosa with no bleeding was noted in the stomach. These findings are compatible with erosive gastritis. Multiple cold forceps biopsies were performed for H. pylori in the antrum, lesser curvature and greater curvature of the stomach.  - Erythema of the mucosa with no bleeding was noted in the duodenum. These findings are compatible with duodenitis. Multiple cold forceps biopsies were performed for celiac disease in the duodenal bulb and second part of the duodenum.    Procedure: CT abdomen pelvis  Findings and Treatment: IMPRESSION:  1.  Multifocal thickening of colon involving the cecum, ascending colon,   descending colon, and sigmoid colon, possibly representing skip lesions   given history of Crohn's disease. Longest segment of colitis involving   the descending and sigmoid colon.  2.  Focal circumferential wall thickening of the ascending colon with   luminal narrowing. While this could represent sequela of chronic   inflammation, underlying neoplasm is not excluded. Consider follow-up   with colonoscopy if not recently performed.  3.  Small nodules at the lung bases, some of which are new compared with   the prior CT 6/27/2023, possibly infectious or inflammatory in etiology.  4.  Other findings as above without significant interval change from   prior CT 6/27/2023      Procedure: CT, head  Findings and Treatment: IMPRESSION  1.  No evidence of acute intracranial hemorrhage or midline shift.  2.  Soft tissue lesion anterior to the left cerebellar hemisphere as   discussed above. This could represent a small meningioma. Other   etiologies are not excluded. Recommend MRI of the brain for further   evaluation.  3.  Probable calcified meningioma along the left frontal convexity.  4.  Chronic small vessel disease with chronic appearing infarcts as   discussed above.

## 2023-11-07 NOTE — DISCHARGE NOTE PROVIDER - ATTENDING DISCHARGE PHYSICAL EXAMINATION:
CONSTITUTIONAL: Well groomed, no apparent distress  EYES: PERRLA and symmetric, EOMI but slowed, No conjunctival or scleral injection, non-icteric  ENMT: Oral mucosa with moist membranes. Dentures present; no pharyngeal injection or exudates  RESP: No respiratory distress, no use of accessory muscles; CTA b/l, no WRR  CV: RRR, +S1S2, no MRG; no JVD; no peripheral edema  GI: TTP in the RLQ, LUQ, and LLQ. Soft, ND, no rebound, no guarding; no palpable masses; no hepatosplenomegaly; no hernia palpated  SKIN: No rashes or ulcers noted; no subcutaneous nodules or induration palpable  NEURO: CN II-XII intact, decreased facial sensation on the R compared to the L, sensation intact in upper and lower extremities b/l to light touch   PSYCH: Appropriate insight/judgment; A+O x 3, mood and affect appropriate, recent/remote memory intact

## 2023-11-07 NOTE — DISCHARGE NOTE PROVIDER - CARE PROVIDER_API CALL
Checo Neely  Nephrology  [address] [place] [address]  Phone: [phone]  Fax: [phone]  Follow Up Time: 1 week   Checo Neely  Nephrology  [address]  [place] [address]  Phone: [phone]  Fax: [phone]  Follow Up Time: 1 week    Maximo Martinez  Gastroenterology  [address]  [place] [address]  Phone: [phone]  Fax: [phone]  Follow Up Time: 1 week

## 2023-11-07 NOTE — CONSULT NOTE ADULT - ATTENDING COMMENTS
89yo W with PMH of meningioma, Crohn's disease in remission for many years, HTN, presenting with abdominal pain. GI consulted for failure to thrive, dysphagia, and unintentional weight loss.    Plan for EGD. RBA discussed with patient.    Agree with plan as outlined above.    DEMETRIA Ernandez MD  GI Attending

## 2023-11-07 NOTE — DISCHARGE NOTE PROVIDER - CARE PROVIDERS DIRECT ADDRESSES
[email or web address]

## 2023-11-07 NOTE — DISCHARGE NOTE PROVIDER - HOSPITAL COURSE
#Discharge: do not delete    89F poor historian PMHx HTN, Meningioma, Crohn's, presents for abdominal pain going on for the past 7 months, HA for the past year, and back pain since the surgery 4 years ago. Admitted for pain control and r/o Chron's flair    1. Abdominal painHas hx of Crohn's dz. Pain has been present for months. Having BMs, no diarrhea, constipation or bloody BMs. Pain predominantly in the RLQ, LLQ, and LUQ. DDx: Colitis vs constipation vs gastric ulcer vs less likely gastroenteritis, treated with Zofran 4mg PRN q6, PPI  -CT Abd to r/o acute flair vs malignancy   -Outpt GI f/up   -Collateral from PCP Dr. Benitez Birch in AM.    2. Back painMajor back surgery in 2018. Pt has had consistent pain since then managed by PCP, ISTOP  - C/w home Percocet q6  - Senna and Miralax for Bowel regimen.    3. [id number] year hx. Complains of localized constant occipital headaches for the last year. Neuro exam WNL. CN II-XII grossly intact except for the presence of asymmetric facial sensation diminished on the R  - CT-head to evaluate for increasing size  - Pain control as above.    4.HTN (hypertension)- c/w losartan 50mg, toprol 50mg, hydral 10mg TID.    5. HLD (hyperlipidemia)-c/w Simvastatin 20mg qhs.    6. Chronic prescription benzodiazepine useEndorses taking 2mg Ativan daily    -C/w 2mg Ativan  - ISTOP.    Inpatient treatment course:     Patient was discharged to: (home/WES/acute rehab/hospice, etc. and w/ home health/home PT/RN/home O2)    New medications:   Changes to old medications:  Medications that were stopped:    Items to follow up as outpatient:    Physical exam at the time of discharge:   CONSTITUTIONAL: Well groomed, no apparent distress  EYES: PERRLA and symmetric, EOMI but slowed, No conjunctival or scleral injection, non-icteric  ENMT: Oral mucosa with moist membranes. Dentures present; no pharyngeal injection or exudates  RESP: No respiratory distress, no use of accessory muscles; CTA b/l, no WRR  CV: RRR, +S1S2, no MRG; no JVD; no peripheral edema  GI: TTP in the RLQ, LUQ, and LLQ. Soft, ND, no rebound, no guarding; no palpable masses; no hepatosplenomegaly; no hernia palpated  SKIN: No rashes or ulcers noted; no subcutaneous nodules or induration palpable  NEURO: CN II-XII intact, decreased facial sensation on the R compared to the L, sensation intact in upper and lower extremities b/l to light touch   PSYCH: Appropriate insight/judgment; A+O x 3, mood and affect appropriate, recent/remote memory intact      LABS & STUDIES:   #Discharge: do not delete    89F poor historian PMHx HTN, Meningioma, Crohn's, presents for abdominal pain going on for the past 7 months, HA for the past year, and back pain since the surgery 4 years ago. Admitted for pain control and r/o crohns flare vs malignancy.   1. Abdominal painHas hx of Crohn's dz. Pain has been present for months. Having BMs, no diarrhea, constipation or bloody BMs. Pain predominantly in the RLQ, LLQ, and LUQ. DDx: Colitis vs constipation vs gastric ulcer vs less likely gastroenteritis, treated with Zofran 4mg PRN q6, PPI  - f/u Dr. Marquis       2. Back pain: Major back surgery in 2018. Pt has had consistent pain since then managed by PCP, ISTOP  - C/w home Percocet q6  - Senna and Miralax for Bowel regimen.    3. [id number] year hx. Complains of localized constant occipital headaches for the last year. Neuro exam WNL. CN II-XII grossly intact except for the presence of asymmetric facial sensation diminished on the R  - Pain control as above.    4.HTN (hypertension)- c/w losartan 50mg, toprol 50mg, hydral 10mg TID.    5. HLD (hyperlipidemia)-c/w Simvastatin 20mg qhs.    6. Chronic prescription benzodiazepine useEndorses taking 2mg Ativan daily  -C/w 2mg Ativan      Inpatient treatment course:     Patient was discharged to: (home/WES/acute rehab/hospice, etc. and w/ home health/home PT/RN/home O2)    New medications:   Changes to old medications:  Medications that were stopped:    Items to follow up as outpatient:    Physical exam at the time of discharge:   CONSTITUTIONAL: Well groomed, no apparent distress  EYES: PERRLA and symmetric, EOMI but slowed, No conjunctival or scleral injection, non-icteric  ENMT: Oral mucosa with moist membranes. Dentures present; no pharyngeal injection or exudates  RESP: No respiratory distress, no use of accessory muscles; CTA b/l, no WRR  CV: RRR, +S1S2, no MRG; no JVD; no peripheral edema  GI: TTP in the RLQ, LUQ, and LLQ. Soft, ND, no rebound, no guarding; no palpable masses; no hepatosplenomegaly; no hernia palpated  SKIN: No rashes or ulcers noted; no subcutaneous nodules or induration palpable  NEURO: CN II-XII intact, decreased facial sensation on the R compared to the L, sensation intact in upper and lower extremities b/l to light touch   PSYCH: Appropriate insight/judgment; A+O x 3, mood and affect appropriate, recent/remote memory intact      LABS & STUDIES:   #Discharge: do not delete    89F poor historian PMHx HTN, Meningioma, Crohn's, presents for abdominal pain going on for the past 7 months, HA for the past year, and back pain since the surgery 4 years ago. Admitted for pain control and r/o crohns flare vs malignancy. Pt now s/p EGD, Colonoscopy, CT a/p   Treated with Zofran 4mg PRN q6, PPI  - f/u Dr. Marquis   - Senna and Miralax for Bowel regimen.      2. Back pain: Major back surgery in 2018. Pt has had consistent pain since then managed by PCP,  - C/w home Percocet q6    3. [id number] year hx. Complains of localized constant occipital headaches for the last year. Neuro exam WNL. CN II-XII grossly intact except for the presence of asymmetric facial sensation diminished on the R  - Pain control as above.    4.HTN (hypertension)- c/w losartan 50mg, toprol 50mg, hydral 10mg TID.    5. HLD (hyperlipidemia)-c/w Simvastatin 20mg qhs.    6. Chronic prescription benzodiazepine useEndorses taking 2mg Ativan daily  -C/w 2mg Ativan      Patient was discharged to: (home/WES/acute rehab/hospice, etc. and w/ home health/home PT/RN/home O2)  Please follow up with Dr. Marquis       Physical exam at the time of discharge:   CONSTITUTIONAL: Well groomed, no apparent distress  EYES: PERRLA and symmetric, EOMI but slowed, No conjunctival or scleral injection, non-icteric  ENMT: Oral mucosa with moist membranes. Dentures present; no pharyngeal injection or exudates  RESP: No respiratory distress, no use of accessory muscles; CTA b/l, no WRR  CV: RRR, +S1S2, no MRG; no JVD; no peripheral edema  GI: TTP in the RLQ, LUQ, and LLQ. Soft, ND, no rebound, no guarding; no palpable masses; no hepatosplenomegaly; no hernia palpated  SKIN: No rashes or ulcers noted; no subcutaneous nodules or induration palpable  NEURO: CN II-XII intact, decreased facial sensation on the R compared to the L, sensation intact in upper and lower extremities b/l to light touch   PSYCH: Appropriate insight/judgment; A+O x 3, mood and affect appropriate, recent/remote memory intact      LABS & STUDIES:   #Discharge: do not delete    89F poor historian PMHx HTN, Meningioma, Crohn's, presents for abdominal pain going on for the past 7 months, HA for the past year, and back pain since the surgery 4 years ago. Admitted for pain control and r/o crohns flare vs malignancy. Pt now s/p EGD, Colonoscopy, CT a/p   Treated with Zofran 4mg PRN q6, PPI  - f/u Dr. Marquis   - Senna and Miralax for Bowel regimen.      2. Back pain: Major back surgery in 2018. Pt has had consistent pain since then managed by PCP,  - C/w home Percocet q6    3. [id number] year hx. Complains of localized constant occipital headaches for the last year. Neuro exam WNL. CN II-XII grossly intact except for the presence of asymmetric facial sensation diminished on the R  - Pain control as above.    4.HTN (hypertension)- c/w losartan 50mg, toprol 50mg, hydral 10mg TID.    5. HLD (hyperlipidemia)-c/w Simvastatin 20mg qhs.    6. Chronic prescription benzodiazepine useEndorses taking 2mg Ativan daily  -C/w 2mg Ativan      Patient was discharged to: home  Please follow up with Dr. Marquis; Dr. Taran Espinosa (GI physician, office of GI will reach out to you for an appointment).       Physical exam at the time of discharge:   CONSTITUTIONAL: Well groomed, no apparent distress  EYES: PERRLA and symmetric, EOMI but slowed, No conjunctival or scleral injection, non-icteric  ENMT: Oral mucosa with moist membranes. Dentures present; no pharyngeal injection or exudates  RESP: No respiratory distress, no use of accessory muscles; CTA b/l, no WRR  CV: RRR, +S1S2, no MRG; no JVD; no peripheral edema  GI: TTP in the RLQ, LUQ, and LLQ. Soft, ND, no rebound, no guarding; no palpable masses; no hepatosplenomegaly; no hernia palpated  SKIN: No rashes or ulcers noted; no subcutaneous nodules or induration palpable  NEURO: CN II-XII intact, decreased facial sensation on the R compared to the L, sensation intact in upper and lower extremities b/l to light touch   PSYCH: Appropriate insight/judgment; A+O x 3, mood and affect appropriate, recent/remote memory intact      LABS & STUDIES:   #Discharge: do not delete    89F poor historian PMHx HTN, Meningioma, Crohn's, presents for abdominal pain going on for the past 7 months, HA for the past year, and back pain since the surgery 4 years ago. Admitted for pain control and r/o crohns flare vs malignancy. Pt now s/p EGD, Colonoscopy, CT a/p   Treated with Zofran 4mg PRN q6, PPI  - f/u Dr. Marquis   - Senna and Miralax for Bowel regimen.      2. Back pain: Major back surgery in 2018. Pt has had consistent pain since then managed by PCP,  - C/w home Percocet q6    3. [id number] year hx. Complains of localized constant occipital headaches for the last year. Neuro exam WNL. CN II-XII grossly intact except for the presence of asymmetric facial sensation diminished on the R  - Pain control as above.    4.HTN (hypertension)- c/w losartan 50mg, toprol 50mg, hydral 10mg TID.    5. HLD (hyperlipidemia)-c/w Simvastatin 20mg qhs.    6. Chronic prescription benzodiazepine useEndorses taking 2mg Ativan daily  -C/w 2mg Ativan      Patient was discharged to: home  Please follow up with Dr. Marquis; Dr. Taran Espinosa (GI physician, office of GI will reach out to you for an appointment).       Physical exam at the time of discharge:   CONSTITUTIONAL: Well groomed, no apparent distress  EYES: PERRLA and symmetric, EOMI but slowed, No conjunctival or scleral injection, non-icteric  ENMT: Oral mucosa with moist membranes. Dentures present; no pharyngeal injection or exudates  RESP: No respiratory distress, no use of accessory muscles; CTA b/l, no WRR  CV: RRR, +S1S2, no MRG; no JVD; no peripheral edema  GI: TTP in the RLQ, LUQ, and LLQ. Soft, ND, no rebound, no guarding; no palpable masses; no hepatosplenomegaly; no hernia palpated  SKIN: No rashes or ulcers noted; no subcutaneous nodules or induration palpable  NEURO: CN II-XII intact, decreased facial sensation on the R compared to the L, sensation intact in upper and lower extremities b/l to light touch   PSYCH: Appropriate insight/judgment; A+O x 3, mood and affect appropriate, recent/remote memory intact

## 2023-11-08 ENCOUNTER — TRANSCRIPTION ENCOUNTER (OUTPATIENT)
Age: 88
End: 2023-11-08

## 2023-11-08 ENCOUNTER — RESULT REVIEW (OUTPATIENT)
Age: 88
End: 2023-11-08

## 2023-11-08 DIAGNOSIS — R53.81 OTHER MALAISE: ICD-10-CM

## 2023-11-08 DIAGNOSIS — Z71.89 OTHER SPECIFIED COUNSELING: ICD-10-CM

## 2023-11-08 DIAGNOSIS — R52 PAIN, UNSPECIFIED: ICD-10-CM

## 2023-11-08 DIAGNOSIS — Z51.5 ENCOUNTER FOR PALLIATIVE CARE: ICD-10-CM

## 2023-11-08 LAB
BLD GP AB SCN SERPL QL: NEGATIVE — SIGNIFICANT CHANGE UP
BLD GP AB SCN SERPL QL: NEGATIVE — SIGNIFICANT CHANGE UP
HCT VFR BLD CALC: 33.5 % — LOW (ref 34.5–45)
HCT VFR BLD CALC: 33.5 % — LOW (ref 34.5–45)
HGB BLD-MCNC: 11 G/DL — LOW (ref 11.5–15.5)
HGB BLD-MCNC: 11 G/DL — LOW (ref 11.5–15.5)
INR BLD: 0.9 — SIGNIFICANT CHANGE UP (ref 0.85–1.18)
INR BLD: 0.9 — SIGNIFICANT CHANGE UP (ref 0.85–1.18)
MCHC RBC-ENTMCNC: 31 PG — SIGNIFICANT CHANGE UP (ref 27–34)
MCHC RBC-ENTMCNC: 31 PG — SIGNIFICANT CHANGE UP (ref 27–34)
MCHC RBC-ENTMCNC: 32.8 GM/DL — SIGNIFICANT CHANGE UP (ref 32–36)
MCHC RBC-ENTMCNC: 32.8 GM/DL — SIGNIFICANT CHANGE UP (ref 32–36)
MCV RBC AUTO: 94.4 FL — SIGNIFICANT CHANGE UP (ref 80–100)
MCV RBC AUTO: 94.4 FL — SIGNIFICANT CHANGE UP (ref 80–100)
NRBC # BLD: 0 /100 WBCS — SIGNIFICANT CHANGE UP (ref 0–0)
NRBC # BLD: 0 /100 WBCS — SIGNIFICANT CHANGE UP (ref 0–0)
PLATELET # BLD AUTO: 111 K/UL — LOW (ref 150–400)
PLATELET # BLD AUTO: 111 K/UL — LOW (ref 150–400)
PROTHROM AB SERPL-ACNC: 10.3 SEC — SIGNIFICANT CHANGE UP (ref 9.5–13)
PROTHROM AB SERPL-ACNC: 10.3 SEC — SIGNIFICANT CHANGE UP (ref 9.5–13)
RBC # BLD: 3.55 M/UL — LOW (ref 3.8–5.2)
RBC # BLD: 3.55 M/UL — LOW (ref 3.8–5.2)
RBC # FLD: 13.3 % — SIGNIFICANT CHANGE UP (ref 10.3–14.5)
RBC # FLD: 13.3 % — SIGNIFICANT CHANGE UP (ref 10.3–14.5)
RH IG SCN BLD-IMP: POSITIVE — SIGNIFICANT CHANGE UP
RH IG SCN BLD-IMP: POSITIVE — SIGNIFICANT CHANGE UP
WBC # BLD: 4.17 K/UL — SIGNIFICANT CHANGE UP (ref 3.8–10.5)
WBC # BLD: 4.17 K/UL — SIGNIFICANT CHANGE UP (ref 3.8–10.5)
WBC # FLD AUTO: 4.17 K/UL — SIGNIFICANT CHANGE UP (ref 3.8–10.5)
WBC # FLD AUTO: 4.17 K/UL — SIGNIFICANT CHANGE UP (ref 3.8–10.5)

## 2023-11-08 PROCEDURE — 99223 1ST HOSP IP/OBS HIGH 75: CPT

## 2023-11-08 PROCEDURE — 99497 ADVNCD CARE PLAN 30 MIN: CPT | Mod: 25

## 2023-11-08 PROCEDURE — 43239 EGD BIOPSY SINGLE/MULTIPLE: CPT

## 2023-11-08 PROCEDURE — 99233 SBSQ HOSP IP/OBS HIGH 50: CPT | Mod: GC

## 2023-11-08 PROCEDURE — 88342 IMHCHEM/IMCYTCHM 1ST ANTB: CPT | Mod: 26

## 2023-11-08 RX ORDER — SOD SULF/SODIUM/NAHCO3/KCL/PEG
4000 SOLUTION, RECONSTITUTED, ORAL ORAL ONCE
Refills: 0 | Status: DISCONTINUED | OUTPATIENT
Start: 2023-11-08 | End: 2023-11-08

## 2023-11-08 RX ORDER — OXYCODONE HYDROCHLORIDE 5 MG/1
5 TABLET ORAL EVERY 6 HOURS
Refills: 0 | Status: DISCONTINUED | OUTPATIENT
Start: 2023-11-08 | End: 2023-11-09

## 2023-11-08 RX ORDER — POLYETHYLENE GLYCOL 3350 17 G/17G
238 POWDER, FOR SOLUTION ORAL ONCE
Refills: 0 | Status: COMPLETED | OUTPATIENT
Start: 2023-11-08 | End: 2023-11-08

## 2023-11-08 RX ORDER — NYSTATIN CREAM 100000 [USP'U]/G
1 CREAM TOPICAL ONCE
Refills: 0 | Status: DISCONTINUED | OUTPATIENT
Start: 2023-11-08 | End: 2023-11-09

## 2023-11-08 RX ORDER — OXYCODONE HYDROCHLORIDE 5 MG/1
10 TABLET ORAL EVERY 8 HOURS
Refills: 0 | Status: DISCONTINUED | OUTPATIENT
Start: 2023-11-08 | End: 2023-11-09

## 2023-11-08 RX ORDER — PANTOPRAZOLE SODIUM 20 MG/1
40 TABLET, DELAYED RELEASE ORAL
Refills: 0 | Status: DISCONTINUED | OUTPATIENT
Start: 2023-11-08 | End: 2023-11-09

## 2023-11-08 RX ORDER — MIRTAZAPINE 45 MG/1
7.5 TABLET, ORALLY DISINTEGRATING ORAL AT BEDTIME
Refills: 0 | Status: DISCONTINUED | OUTPATIENT
Start: 2023-11-08 | End: 2023-11-09

## 2023-11-08 RX ADMIN — ATORVASTATIN CALCIUM 40 MILLIGRAM(S): 80 TABLET, FILM COATED ORAL at 21:36

## 2023-11-08 RX ADMIN — Medication 2 MILLIGRAM(S): at 21:36

## 2023-11-08 RX ADMIN — MIRTAZAPINE 7.5 MILLIGRAM(S): 45 TABLET, ORALLY DISINTEGRATING ORAL at 21:36

## 2023-11-08 RX ADMIN — Medication 20 MILLIGRAM(S): at 19:31

## 2023-11-08 RX ADMIN — Medication 5 MILLIGRAM(S): at 10:17

## 2023-11-08 RX ADMIN — PANTOPRAZOLE SODIUM 40 MILLIGRAM(S): 20 TABLET, DELAYED RELEASE ORAL at 18:10

## 2023-11-08 RX ADMIN — POLYETHYLENE GLYCOL 3350 238 GRAM(S): 17 POWDER, FOR SOLUTION ORAL at 23:44

## 2023-11-08 RX ADMIN — Medication 10 MILLIGRAM(S): at 05:35

## 2023-11-08 RX ADMIN — OXYCODONE HYDROCHLORIDE 10 MILLIGRAM(S): 5 TABLET ORAL at 18:11

## 2023-11-08 RX ADMIN — PANTOPRAZOLE SODIUM 40 MILLIGRAM(S): 20 TABLET, DELAYED RELEASE ORAL at 05:38

## 2023-11-08 RX ADMIN — Medication 50 MILLIGRAM(S): at 12:16

## 2023-11-08 RX ADMIN — Medication 5 MILLIGRAM(S): at 21:36

## 2023-11-08 RX ADMIN — Medication 10 MILLIGRAM(S): at 21:36

## 2023-11-08 RX ADMIN — Medication 10 MILLIGRAM(S): at 13:41

## 2023-11-08 RX ADMIN — LOSARTAN POTASSIUM 50 MILLIGRAM(S): 100 TABLET, FILM COATED ORAL at 05:38

## 2023-11-08 NOTE — PROGRESS NOTE ADULT - ATTENDING COMMENTS
89F poor historian PMHx HTN, Meningioma, Crohn's, presents for abdominal pain going on for the past 7 months, HA for the past year, and back pain since the surgery 4 years ago. Additionally reports acute weight loss with concern for malignant process vs acute worsening of dysphagia. Given complexity of symptoms there is some concern for possible new malignancy with this patient. Pending CT imaging and XR to eval for bony mets. CT showed thickening in ascending colon, without acute inflammation concern for poss malignancy.     Labs and imaging reviewed    Problem List  #Severe Protein Calorie Malnutrition with acute weight loss  #Dysphagia  #Crohn's Dz  #Chronic Pain  #Moderate Major Depression    Plan:  -C-Scope tomorrow  -Continue Pain control   -Palliative consulted appreciate recs  -Start Mirtazapine QHS  -Prep tonight with [employer]    Patient meets inpatient criteria due to the inability to tolerate a PO diet to maintain nutrition discussed with her risks/benefits of continued work up and patient amenable to continue treatment. Expected LOS 24 additional hours.
89F poor historian PMHx HTN, Meningioma, Crohn's, presents for abdominal pain going on for the past 7 months, HA for the past year, and back pain since the surgery 4 years ago. Additionally reports acute weight loss with concern for malignant process vs acute worsening of dysphagia. Given complexity of symptoms there is some concern for possible new malignancy with this patient. Pending CT imaging and XR to eval for bony mets. Additionally, will plan for EGD tomorrow. If nothing is revealed will likely d/c after EGD.     Labs and imaging reviewed    Problem List  #Severe Protein Calorie Malnutrition with acute weight loss  #Dysphagia  #Crohn's Dz  #Chronic Pain  #Moderate Major Depression    Plan:  -CTAP w/IV and OC to eval for poss malignancy  -EGD to aid in screening for dysphagia  -Spine XR to look for lytic lesion  -Patient ambulating without pain today, would start SNRI for pain control for patient long term  -Still pending further work up    Patient meets inpatient criteria due to the inability to tolerate a PO diet to maintain nutrition discussed with her risks/benefits of continued work up and patient amenable to continue treatment. Expected LOS 24-48 additional hours.

## 2023-11-08 NOTE — CONSULT NOTE ADULT - PROBLEM SELECTOR RECOMMENDATION 4
Patient has Mengioma for 20 years. Complains of localized constant occipital headaches for the last year. Continue care as per primary team.

## 2023-11-08 NOTE — CONSULT NOTE ADULT - PROBLEM SELECTOR RECOMMENDATION 9
Patient with chronic pain and sees chronic pain doctor. Was taking Percocet 10mg/325mg at home. Would recommend Chronic pain referral and Oxycodone 5mg for moderate and 10mg for severe.

## 2023-11-08 NOTE — CONSULT NOTE ADULT - ASSESSMENT
89 year old pain, debility, Meningioma, Abdominal pain, advance care planning and encounter for palliative care.

## 2023-11-08 NOTE — CONSULT NOTE ADULT - CONVERSATION DETAILS
In addition to the EM visit, an advance care planning meeting was performed  Start time: 1110am  End time: 1130am  Total time: 20 minutes   A face to face meeting to discuss advance care planning was held today regarding: ADRI MARTEL  Primary decision maker: Adri Martel   Alternate/surrogate: Rylie Birch   Discussed advance directives including, but not limited to, healthcare proxy and code status.  Decision regarding code status: DNR/DNI  Documentation completed today: MOLST    Discussion had with patient regarding advance directives. She stated that she would want to have a natural death and not return to the hospital.  A MOLST was reviewed with her and she stated that she should be a DNR/DNI/ Limited medical interventions / Do not rehospitalize/ No feeding tube / use IV fluids and Use antibiotics and No Dialysis. MOLST completed and copy given to patient.

## 2023-11-08 NOTE — CONSULT NOTE ADULT - PROBLEM SELECTOR RECOMMENDATION 3
Has hx of Crohn's dz. Pain has been present for months. Having BMs, no diarrhea, constipation or bloody BMs. Pain predominantly in the RLQ, LLQ, and LUQ. Patient possibly getting Colonoscopy after she received her EGD.

## 2023-11-08 NOTE — CONSULT NOTE ADULT - PROBLEM SELECTOR RECOMMENDATION 6
Patient made herself a DNR/DNI. MOLST filled out. Please see [facility] note above. Patient continues to get workup, does not want to be rehospitalized after this admission. Currently does not have a hospice diagnosis, If Colonscopy shows mass can posisbly be cancer, most likely patient would not want to treat and would be home hospice appropriate.  As discussed during the palliative IDT meeting and as identified during the patients PSSA screening the patient would benefit from: Massage Therapy and music therapy   - Gnosticism/Spiritual practice: Scientologist  - Coping: [x ] well [ ] with difficulty [ ] poor coping   - Support system: [x ] strong [ ] adequate [ ] inadequate  - All questions answered, emotional support provided  -  primary team   - Please contact Palliative Medicine 24/7 at [phone] for any acute symptoms or further questions  - Will continue to follow with you

## 2023-11-08 NOTE — CHART NOTE - NSCHARTNOTEFT_GEN_A_CORE
Patient is s/p EGD in endoscopy unit.     Findings are as follows:   - A sliding medium size hiatal hernia was seen, the esophagogastric junction(EGJ) was noted at 31 cm, with hiatal narrowing at 34 cm from the incisors. Retroflexion view in the stomach confirmed the size and morphology of the hernia.  - Grade C esophagitis with no bleeding was seen in the GE junction, compatible with nonspecific erosive esophagitis.  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 The varices were not bleeding.  - Diffuse erythema of the mucosa with no bleeding was noted in the stomach. These findings are compatible with erosive gastritis. Multiple cold forceps biopsies were performed for H. pylori in the antrum, lesser curvature and greater curvature of the stomach.  - Erythema of the mucosa with no bleeding was noted in the duodenum. These findings are compatible with duodenitis. Multiple cold forceps biopsies were performed for celiac disease in the duodenal bulb and second part of the duodenum.    Recommendations:   - Protonix 40 mg PO BID for 2 weeks, followed by 40 mg PO daily    - Obtain CT chest with IV contrast    - Advance diet as tolerated    - Return to floor for further management    - Await pathology results  - May consider esophageal manometry out-patient   - Repeat EGD in 2 months     Case discussed with Dr. Ernandez. GI Team will continue to follow.     Leslie Roberts D.O.   Gastroenterology Fellow  Weekday 7am-5pm Pager: [phone]  Weeknights/Weekend/Holiday Coverage: Please call the  for contact info

## 2023-11-08 NOTE — CONSULT NOTE ADULT - SUBJECTIVE AND OBJECTIVE BOX
ADRI BULL          MRN-[id number]            (mm/dd/yyyy)    HPI:  89F PMHx Meningioma, 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 cramping abd pain that is waxing and waning 5/10 intensity that starts in her RLQ and radiates to her LLQ. The pt notes that she is extremely nauseas and has trouble keeping down food, and has subsequently lost 30lbs over the past year and notes significant fatigue. She states that she has a 20 year hx of several benign meningiomas that cause localized occipital HA that are constant and worse in the morning. She complains of 7/10 back pain that is constant since a back surgery 4 years prior. She notes that she has experienced dysuria for the past few days. She denies fevers, light headedness, SOB, cough, or CP. (2023 18:58)      PAST MEDICAL & SURGICAL HISTORY:  HTN (hypertension)      Brain tumor      Other specified hypothyroidism      History of partial hysterectomy      History of appendectomy          FAMILY HISTORY:  Family history of cancer (Sibling)         SOCIAL HISTORY:     ROS:    Unable to attain due to:                      Dyspnea (Yaneli 0-10):                        N/V (Y/N):                              Secretions (Y/N) :                Agitation(Y/N):   Pain (Y/N):       -Provocation/Palliation:  -Quality/Quantity:  -Radiating:  -Severity:  -Timing/Frequency:  -Impact on ADLs:    General:  Denied  HEENT:    Denied  Neck:  Denied  CVS:  Denied  Resp:  Denied  GI:  Denied  :  Denied  Musc:  Denied  Neuro:  Denied  Psych:  Denied  Skin:  Denied  Lymph:  Denied    ALLERGIES:  Allergies    No Known Allergies    Intolerances        Opiate Naive (Y/N): Y  -iStop reviewed (Y/N): Y (Ref#:     [id number]       )  A	N	Y	O	10/11/2023	2023	oxycodone-acetaminophen  mg tab	185	26	Gil Oglesby	[id number]	Medicare	[id number]  A	N	Y	B	10/10/2023	10/16/2023	lorazepam 2 mg tablet	120	30	Federico Whitfield M, MD	[id number]	Medicare	[employer]    MEDICATIONS:      MEDICATIONS  (STANDING):  atorvastatin 40 milliGRAM(s) Oral at bedtime  hydrALAZINE 10 milliGRAM(s) Oral every 8 hours  influenza  Vaccine (HIGH DOSE) 0.7 milliLiter(s) IntraMuscular once  LORazepam     Tablet 2 milliGRAM(s) Oral every 12 hours  losartan 50 milliGRAM(s) Oral every 24 hours  metoclopramide 5 milliGRAM(s) Oral every 12 hours  metoprolol succinate ER 50 milliGRAM(s) Oral every 24 hours  pantoprazole    Tablet 40 milliGRAM(s) Oral before breakfast  polyethylene glycol 3350 17 Gram(s) Oral every 24 hours  senna 1 Tablet(s) Oral every 24 hours    MEDICATIONS  (PRN):  aluminum hydroxide/magnesium hydroxide/simethicone Suspension 30 milliLiter(s) Oral every 4 hours PRN Dyspepsia  melatonin 3 milliGRAM(s) Oral at bedtime PRN Insomnia  ondansetron Injectable 4 milliGRAM(s) IV Push every 8 hours PRN Nausea and/or Vomiting  oxycodone    5 mG/acetaminophen 325 mG 1 Tablet(s) Oral every 6 hours PRN Mild Pain (1 - 3)      LABS:    CBC:                        11.0   4.17  )-----------( 111      ( 2023 05:30 )             33.5     CMP:        136  |  104  |  17  ----------------------------<  127<H>  3.8   |  20<L>  |  0.84    Ca    8.7      2023 18:36  Phos  3.6     11-07  Mg     2.0     11-07    TPro  5.6<L>  /  Alb  3.1<L>  /  TBili  <0.2  /  DBili  x   /  AST  17  /  ALT  9<L>  /  AlkPhos  49  11-07    PT/INR - ( 2023 05:30 )   PT: 10.3 sec;   INR: 0.90          PTT - ( 2023 19:12 )  PTT:29.1 sec  Urinalysis Basic - ( 2023 18:36 )    Color: x / Appearance: x / SG: x / pH: x  Gluc: 127 mg/dL / Ketone: x  / Bili: x / Urobili: x   Blood: x / Protein: x / Nitrite: x   Leuk Esterase: x / RBC: x / WBC x   Sq Epi: x / Non Sq Epi: x / Bacteria: x        IMAGING:  Reviewed    PHYSICAL EXAM:  T(C): 36.4 (23 @ 07:39), Max: 36.9 (23 @ 23:00)  HR: 62 (23 @ 07:39) (55 - 80)  BP: 162/61 (23 @ 07:39) (145/75 - 170/77)  RR: 18 (23 @ 07:39) (18 - 18)  SpO2: 96% (23 @ 07:39) (93% - 97%)  Wt(kg): --  Daily     Daily   CAPILLARY BLOOD GLUCOSE        I&O's Summary      GENERAL:  [ ]Alert  [ ]Oriented x   [ ]Lethargic due to sedation  [ ]Cachexia [ ]Verbal  [ ]Non-Verbal  Behavioral:   [ ] Anxiety  [ ] Delirium [ ] Agitation [ ] Other  HEENT:  [ ]Normal   [ ]Dry mouth   [ ]ET Tube  [ ]Oral lesions  PULMONARY:   [ ]Clear  [ ]Tachypnea  [ ]Audible excessive secretions   [ ]Rhonchi     [ ]Right [ ]Left [ ]Bilateral  [ ]Crackles        [ ]Right [ ]Left [ ]Bilateral  [ ]Wheezing     [ ]Right [ ]Left [ ]Bilateral  CARDIOVASCULAR:    [ ]Regular [ ]Irregular [ ]Tachy  [ ]Jg [ ]Murmur [ ]Other  GASTROINTESTINAL:  [ ]Soft  [ ]Distended   [ ]+BS  [ ]Non tender [ ]Tender  [ ]PEG [ ]OGT/NGT  [] flexiseal with output  Last BM:   GENITOURINARY:  [ ]Normal [ ] Incontinent   [ ]Oliguria/Anuria   [ ]Simeon  MUSCULOSKELETAL:   [ ]Normal   [ ]Weakness  [ ]Bed/Wheelchair bound [ ]Edema  NEUROLOGIC:   [ ]No focal deficits  [ ] Cognitive impairment  [ ] Dysphagia [ ]Dysarthria [ ] Paresis [  ]Other   SKIN:   [ ]Normal   [ ]Pressure ulcer(s)  [ ]Rash     Preadmit Karnofsky:  %           Current Karnofsky:     %  Cachexia (Y/N):   BMI:    ADVANCED DIRECTIVES:     Full Code     No documented HCP form found on [facility]     No Living will / POA / Advance directives found on [facility] / [facility].     No documented GOC notes on [facility]    DECISION MAKER: The patient is able to participate in symptomatic assessment but may not be able to make complex medical decisions  LEGAL SURROGATE: No documented HCP in paper chart / [facility] / [facility].  Alternate surrogate:     GOALS OF CARE DISCUSSION:       Palliative care info/counseling provided	           Family meeting       Advanced Directives addressed please see Advance Care Planning Note	           See previous Palliative Medicine Note       Documentation of GOC: 	          REFERRALS:	        Palliative Med        Unit SW/Case Mgmt              Speech/Swallow       Patient/Family Support       Massage Therapy       Music Therapy       Pet Therapy       Hospice       Nutrition       Dietician       PT/OT ADRI BULL          MRN-[id number]            (1930)    HPI:  89F PMHx Meningioma, 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 cramping abd pain that is waxing and waning 5/10 intensity that starts in her RLQ and radiates to her LLQ. The pt notes that she is extremely nauseas and has trouble keeping down food, and has subsequently lost 30lbs over the past year and notes significant fatigue. She states that she has a 20 year hx of several benign meningiomas that cause localized occipital HA that are constant and worse in the morning. She complains of 7/10 back pain that is constant since a back surgery 4 years prior. She notes that she has experienced dysuria for the past few days. She denies fevers, light headedness, SOB, cough, or CP. (2023 18:58)      PAST MEDICAL & SURGICAL HISTORY:  HTN (hypertension)    Brain tumor    Other specified hypothyroidism    History of partial hysterectomy    History of appendectomy    FAMILY HISTORY:  Family history of cancer (Sibling)      SOCIAL HISTORY:  Patient lives with a Live-in [facility]. Denies ETYOOH, Smoking and Drugs     ROS:    Unable to attain due to:  n/a                     Dyspnea (Yaneli 0-10):   0                     N/V (Y/N):         Y                     Secretions (Y/N) :  N             Agitation(Y/N): N  Pain (Y/N):    Yes  -Provocation/Palliation: Pain in her head, pain medication make the pain better  -Quality/Quantity: thobbing  -Radiating: N  -Severity: 6/10  -Timing/Frequency: constant pain   -Impact on ADLs: Pain affects ability to perform ADL's     General: +weakness   HEENT:    Denied  Neck:  Denied  CVS:  Denied  Resp:  Denied  GI:  Denied  :  Denied  Musc:  Denied  Neuro:  Denied  Psych:  Denied  Skin:  Denied  Lymph:  Denied    ALLERGIES:  Allergies    No Known Allergies    Intolerances    Opiate Naive (Y/N): Y  -iStop reviewed (Y/N): Y (Ref#:     [id number]       )  A	N	Y	O	10/11/2023	2023	oxycodone-acetaminophen  mg tab	185	26	Gil Oglesby	[id number]	Medicare	[id number]  A	N	Y	B	10/10/2023	10/16/2023	lorazepam 2 mg tablet	120	30	Federico Whitfield M, MD	[id number]	Medicare	[employer]    MEDICATIONS:      MEDICATIONS  (STANDING):  atorvastatin 40 milliGRAM(s) Oral at bedtime  hydrALAZINE 10 milliGRAM(s) Oral every 8 hours  influenza  Vaccine (HIGH DOSE) 0.7 milliLiter(s) IntraMuscular once  LORazepam     Tablet 2 milliGRAM(s) Oral every 12 hours  losartan 50 milliGRAM(s) Oral every 24 hours  metoclopramide 5 milliGRAM(s) Oral every 12 hours  metoprolol succinate ER 50 milliGRAM(s) Oral every 24 hours  pantoprazole    Tablet 40 milliGRAM(s) Oral before breakfast  polyethylene glycol 3350 17 Gram(s) Oral every 24 hours  senna 1 Tablet(s) Oral every 24 hours    MEDICATIONS  (PRN):  aluminum hydroxide/magnesium hydroxide/simethicone Suspension 30 milliLiter(s) Oral every 4 hours PRN Dyspepsia  melatonin 3 milliGRAM(s) Oral at bedtime PRN Insomnia  ondansetron Injectable 4 milliGRAM(s) IV Push every 8 hours PRN Nausea and/or Vomiting  oxycodone    5 mG/acetaminophen 325 mG 1 Tablet(s) Oral every 6 hours PRN Mild Pain (1 - 3)    LABS:    CBC:                        11.0   4.17  )-----------( 111      ( 2023 05:30 )             33.5     CMP:        136  |  104  |  17  ----------------------------<  127<H>  3.8   |  20<L>  |  0.84    Ca    8.7      2023 18:36  Phos  3.6     11-  Mg     2.0     -    TPro  5.6<L>  /  Alb  3.1<L>  /  TBili  <0.2  /  DBili  x   /  AST  17  /  ALT  9<L>  /  AlkPhos  49  11-07    PT/INR - ( 2023 05:30 )   PT: 10.3 sec;   INR: 0.90       PTT - ( 2023 19:12 )  PTT:29.1 sec  Urinalysis Basic - ( 2023 18:36 )    Color: x / Appearance: x / SG: x / pH: x  Gluc: 127 mg/dL / Ketone: x  / Bili: x / Urobili: x   Blood: x / Protein: x / Nitrite: x   Leuk Esterase: x / RBC: x / WBC x   Sq Epi: x / Non Sq Epi: x / Bacteria: x    IMAGING:   < from: CT Abdomen and Pelvis w/ Oral Cont and w/ IV Cont (23 @ 16:50) >    ACC: [id number] EXAM:  CT ABDOMEN AND PELVIS OC IC   ORDERED BY: ZACKERY CHANDRA     PROCEDURE DATE:  2023        IMPRESSION:  1.  Multifocal thickening of colon involving the cecum, ascending colon,   descending colon, and sigmoid colon, possibly representing skip lesions   given history of Crohn's disease. Longest segment of colitis involving   the descending and sigmoid colon.  2.  Focal circumferential wall thickening of the ascending colon with   luminal narrowing. While this could represent sequela of chronic   inflammation, underlying neoplasm is not excluded. Consider follow-up   with colonoscopy if not recently performed.  3.  Small nodules at the lung bases, some of which are new compared with   the prior CT 2023, possibly infectious or inflammatory in etiology.  4.  Other findings as above without significant interval change from   prior CT 2023    < end of copied text >    EGD:   Findings are as follows:   - A sliding medium size hiatal hernia was seen, the esophagogastric junction(EGJ) was noted at 31 cm, with hiatal narrowing at 34 cm from the incisors. Retroflexion view in the stomach confirmed the size and morphology of the hernia.  - Grade C esophagitis with no bleeding was seen in the GE junction, compatible with nonspecific erosive esophagitis.  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 The varices were not bleeding.  - Diffuse erythema of the mucosa with no bleeding was noted in the stomach. These findings are compatible with erosive gastritis. Multiple cold forceps biopsies were performed for H. pylori in the antrum, lesser curvature and greater curvature of the stomach.  - Erythema of the mucosa with no bleeding was noted in the duodenum. These findings are compatible with duodenitis. Multiple cold forceps biopsies were performed for celiac disease in the duodenal bulb and second part of the duodenum.    PHYSICAL EXAM:  T(C): 36.4 (23 @ 07:39), Max: 36.9 (23 @ 23:00)  HR: 62 (23 @ 07:39) (55 - 80)  BP: 162/61 (23 @ 07:39) (145/75 - 170/77)  RR: 18 (23 @ 07:39) (18 - 18)  SpO2: 96% (23 @ 07:39) (93% - 97%)  Weight (kg): 54.4 (2023 16:12)  Daily     Daily   CAPILLARY BLOOD GLUCOSE    I&O's Summary    GENERAL:  [x ]Alert  [ x]Oriented x 4   [ ]Lethargic due to sedation  [ ]Cachexia [ x]Verbal  [ ]Non-Verbal  Behavioral:   [ ] Anxiety  [ ] Delirium [ ] Agitation [x ] Other  HEENT:  [ ]Normal   [x ]Dry mouth   [ ]ET Tube  [ ]Oral lesions  PULMONARY:   [x ]Clear  [ ]Tachypnea  [ ]Audible excessive secretions   [ ]Rhonchi     [ ]Right [ ]Left [ ]Bilateral  [ ]Crackles        [ ]Right [ ]Left [ ]Bilateral  [ ]Wheezing     [ ]Right [ ]Left [ ]Bilateral  CARDIOVASCULAR:    [ ]Regular [ ]Irregular [ ]Tachy  [ ]Jg [ ]Murmur [ ]Other  GASTROINTESTINAL:  [ ]Soft  [ ]Distended   [ ]+BS  [ ]Non tender [ ]Tender  [ ]PEG [ ]OGT/NGT  [] flexiseal with output  Last BM:   GENITOURINARY:  [ ]Normal [ ] Incontinent   [ ]Oliguria/Anuria   [ ]Simeon  MUSCULOSKELETAL:   [ ]Normal   [ ]Weakness  [ ]Bed/Wheelchair bound [ ]Edema  NEUROLOGIC:   [ ]No focal deficits  [ ] Cognitive impairment  [ ] Dysphagia [ ]Dysarthria [ ] Paresis [  ]Other   SKIN:   [ ]Normal   [ ]Pressure ulcer(s)  [ ]Rash     Preadmit Karnofsky: 70 %           Current Karnofsky:     %  Cachexia (Y/N): N  BMI (kg/m2): 22.7 (2023 16:12)      ADVANCED DIRECTIVES:     Full Code     No documented HCP form found on [facility]     No Living will / POA / Advance directives found on [facility] / [facility].     No documented GOC notes on [facility]    DECISION MAKER: The patient is able to participate in symptomatic assessment but may not be able to make complex medical decisions  LEGAL SURROGATE: No documented HCP in paper chart / [facility] / [facility].  Alternate surrogate:     GOALS OF CARE DISCUSSION:       Palliative care info/counseling provided	           Family meeting       Advanced Directives addressed please see Advance Care Planning Note	           See previous Palliative Medicine Note       Documentation of GOC: 	          REFERRALS:	        Palliative Med        Unit SW/Case Mgmt              Speech/Swallow       Patient/Family Support       Massage Therapy       Music Therapy       Pet Therapy       Hospice       Nutrition       Dietician       PT/OT ADRI BULL          MRN-[id number]            (1930)    HPI:  89F PMHx Meningioma, 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 cramping abd pain that is waxing and waning 5/10 intensity that starts in her RLQ and radiates to her LLQ. The pt notes that she is extremely nauseas and has trouble keeping down food, and has subsequently lost 30lbs over the past year and notes significant fatigue. She states that she has a 20 year hx of several benign meningiomas that cause localized occipital HA that are constant and worse in the morning. She complains of 7/10 back pain that is constant since a back surgery 4 years prior. She notes that she has experienced dysuria for the past few days. She denies fevers, light headedness, SOB, cough, or CP. (2023 18:58)      PAST MEDICAL & SURGICAL HISTORY:  HTN (hypertension)    Brain tumor    Other specified hypothyroidism    History of partial hysterectomy    History of appendectomy    FAMILY HISTORY:  Family history of cancer (Sibling)      SOCIAL HISTORY:  Patient lives with a Live-in [facility]. Denies ETYOOH, Smoking and Drugs     ROS:    Unable to attain due to:  n/a                     Dyspnea (Yaneli 0-10):   0                     N/V (Y/N):         Y                     Secretions (Y/N) :  N             Agitation(Y/N): N  Pain (Y/N):    Yes  -Provocation/Palliation: Pain in her head, pain medication make the pain better  -Quality/Quantity: thobbing  -Radiating: N  -Severity: 6/10  -Timing/Frequency: constant pain   -Impact on ADLs: Pain affects ability to perform ADL's     General: +weakness   HEENT:    Denied  Neck:  Denied  CVS:  Denied  Resp:  Denied  GI:  Denied  :  Denied  Musc:  Denied  Neuro:  Denied  Psych:  Denied  Skin:  Denied  Lymph:  Denied    ALLERGIES:  Allergies    No Known Allergies    Intolerances    Opiate Naive (Y/N): Y  -iStop reviewed (Y/N): Y (Ref#:     [id number]       )  A	N	Y	O	10/11/2023	2023	oxycodone-acetaminophen  mg tab	185	26	Gil Oglesby	[id number]	Medicare	[id number]  A	N	Y	B	10/10/2023	10/16/2023	lorazepam 2 mg tablet	120	30	Federico Whitfield M, MD	[id number]	Medicare	[employer]    MEDICATIONS:      MEDICATIONS  (STANDING):  atorvastatin 40 milliGRAM(s) Oral at bedtime  hydrALAZINE 10 milliGRAM(s) Oral every 8 hours  influenza  Vaccine (HIGH DOSE) 0.7 milliLiter(s) IntraMuscular once  LORazepam     Tablet 2 milliGRAM(s) Oral every 12 hours  losartan 50 milliGRAM(s) Oral every 24 hours  metoclopramide 5 milliGRAM(s) Oral every 12 hours  metoprolol succinate ER 50 milliGRAM(s) Oral every 24 hours  pantoprazole    Tablet 40 milliGRAM(s) Oral before breakfast  polyethylene glycol 3350 17 Gram(s) Oral every 24 hours  senna 1 Tablet(s) Oral every 24 hours    MEDICATIONS  (PRN):  aluminum hydroxide/magnesium hydroxide/simethicone Suspension 30 milliLiter(s) Oral every 4 hours PRN Dyspepsia  melatonin 3 milliGRAM(s) Oral at bedtime PRN Insomnia  ondansetron Injectable 4 milliGRAM(s) IV Push every 8 hours PRN Nausea and/or Vomiting  oxycodone    5 mG/acetaminophen 325 mG 1 Tablet(s) Oral every 6 hours PRN Mild Pain (1 - 3)    LABS:    CBC:                        11.0   4.17  )-----------( 111      ( 2023 05:30 )             33.5     CMP:        136  |  104  |  17  ----------------------------<  127<H>  3.8   |  20<L>  |  0.84    Ca    8.7      2023 18:36  Phos  3.6     11-  Mg     2.0     -    TPro  5.6<L>  /  Alb  3.1<L>  /  TBili  <0.2  /  DBili  x   /  AST  17  /  ALT  9<L>  /  AlkPhos  49  11-07    PT/INR - ( 2023 05:30 )   PT: 10.3 sec;   INR: 0.90       PTT - ( 2023 19:12 )  PTT:29.1 sec  Urinalysis Basic - ( 2023 18:36 )    Color: x / Appearance: x / SG: x / pH: x  Gluc: 127 mg/dL / Ketone: x  / Bili: x / Urobili: x   Blood: x / Protein: x / Nitrite: x   Leuk Esterase: x / RBC: x / WBC x   Sq Epi: x / Non Sq Epi: x / Bacteria: x    IMAGING:   < from: CT Abdomen and Pelvis w/ Oral Cont and w/ IV Cont (23 @ 16:50) >    ACC: [id number] EXAM:  CT ABDOMEN AND PELVIS OC IC   ORDERED BY: ZACKERY CHANDRA     PROCEDURE DATE:  2023        IMPRESSION:  1.  Multifocal thickening of colon involving the cecum, ascending colon,   descending colon, and sigmoid colon, possibly representing skip lesions   given history of Crohn's disease. Longest segment of colitis involving   the descending and sigmoid colon.  2.  Focal circumferential wall thickening of the ascending colon with   luminal narrowing. While this could represent sequela of chronic   inflammation, underlying neoplasm is not excluded. Consider follow-up   with colonoscopy if not recently performed.  3.  Small nodules at the lung bases, some of which are new compared with   the prior CT 2023, possibly infectious or inflammatory in etiology.  4.  Other findings as above without significant interval change from   prior CT 2023    < end of copied text >    EGD:   Findings are as follows:   - A sliding medium size hiatal hernia was seen, the esophagogastric junction(EGJ) was noted at 31 cm, with hiatal narrowing at 34 cm from the incisors. Retroflexion view in the stomach confirmed the size and morphology of the hernia.  - Grade C esophagitis with no bleeding was seen in the GE junction, compatible with nonspecific erosive esophagitis.  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 The varices were not bleeding.  - Diffuse erythema of the mucosa with no bleeding was noted in the stomach. These findings are compatible with erosive gastritis. Multiple cold forceps biopsies were performed for H. pylori in the antrum, lesser curvature and greater curvature of the stomach.  - Erythema of the mucosa with no bleeding was noted in the duodenum. These findings are compatible with duodenitis. Multiple cold forceps biopsies were performed for celiac disease in the duodenal bulb and second part of the duodenum.    PHYSICAL EXAM:  T(C): 36.4 (23 @ 07:39), Max: 36.9 (23 @ 23:00)  HR: 62 (23 @ 07:39) (55 - 80)  BP: 162/61 (23 @ 07:39) (145/75 - 170/77)  RR: 18 (23 @ 07:39) (18 - 18)  SpO2: 96% (23 @ 07:39) (93% - 97%)  Weight (kg): 54.4 (2023 16:12)  Daily     Daily   CAPILLARY BLOOD GLUCOSE    I&O's Summary    GENERAL:  [x ]Alert  [ x]Oriented x 4   [ ]Lethargic due to sedation  [ ]Cachexia [ x]Verbal  [ ]Non-Verbal  Behavioral:   [ ] Anxiety  [ ] Delirium [ ] Agitation [x ] Other  HEENT:  [ ]Normal   [x ]Dry mouth   [ ]ET Tube  [ ]Oral lesions  PULMONARY:   [x ]Clear  [ ]Tachypnea  [ ]Audible excessive secretions   [ ]Rhonchi     [ ]Right [ ]Left [ ]Bilateral  [ ]Crackles        [ ]Right [ ]Left [ ]Bilateral  [ ]Wheezing     [ ]Right [ ]Left [ ]Bilateral  CARDIOVASCULAR:    [x ]Regular [ ]Irregular [ ]Tachy  [ ]Jg [ ]Murmur [ ]Other  GASTROINTESTINAL:  [x ]Soft  [ ]Distended   [ ]+BS  [x ]Non tender [ ]Tender  [ ]PEG [ ]OGT/NGT  [] flexiseal with output  Last BM:   GENITOURINARY:  [ x]Normal [ ] Incontinent   [ ]Oliguria/Anuria   [ ]Simeon  MUSCULOSKELETAL:   [ ]Normal   [x ]Weakness  [ ]Bed/Wheelchair bound [ ]Edema  NEUROLOGIC:   [ x]No focal deficits  [ ] Cognitive impairment  [ ] Dysphagia [ ]Dysarthria [ ] Paresis [  ]Other   SKIN:   [ x]Normal   [ ]Pressure ulcer(s)  [ ]Rash     Preadmit Karnofsky: 70 %           Current Karnofsky:  07  %  Cachexia (Y/N): N  BMI (kg/m2): 22.7 (2023 16:12)    ADVANCED DIRECTIVES:     Full Code     HCP form found on [facility]      No Living will / POA / Advance directives found on [facility] / [facility].     No documented GOC notes on [facility]    DECISION MAKER: The patient is able to participate in symptomatic assessment but may not be able to make complex medical decisions  LEGAL SURROGATE: No documented HCP in paper chart / [facility] / [facility].  Alternate surrogate:     GOALS OF CARE DISCUSSION:       Palliative care info/counseling provided	           Family meeting       Advanced Directives addressed please see Advance Care Planning Note	           See previous Palliative Medicine Note       Documentation of GOC: 	DNR/DNI          REFERRALS:	        Palliative Med        Unit SW/Case Mgmt       Massage Therapy       Music Therapy       PT/OT

## 2023-11-09 ENCOUNTER — RESULT REVIEW (OUTPATIENT)
Age: 88
End: 2023-11-09

## 2023-11-09 ENCOUNTER — TRANSCRIPTION ENCOUNTER (OUTPATIENT)
Age: 88
End: 2023-11-09

## 2023-11-09 VITALS
SYSTOLIC BLOOD PRESSURE: 154 MMHG | RESPIRATION RATE: 17 BRPM | WEIGHT: 110.23 LBS | OXYGEN SATURATION: 95 % | HEART RATE: 67 BPM | DIASTOLIC BLOOD PRESSURE: 67 MMHG | TEMPERATURE: 97 F

## 2023-11-09 LAB
ALBUMIN SERPL ELPH-MCNC: 3.2 G/DL — LOW (ref 3.3–5)
ALBUMIN SERPL ELPH-MCNC: 3.2 G/DL — LOW (ref 3.3–5)
ALP SERPL-CCNC: 67 U/L — SIGNIFICANT CHANGE UP (ref 40–120)
ALP SERPL-CCNC: 67 U/L — SIGNIFICANT CHANGE UP (ref 40–120)
ALT FLD-CCNC: 38 U/L — SIGNIFICANT CHANGE UP (ref 10–45)
ALT FLD-CCNC: 38 U/L — SIGNIFICANT CHANGE UP (ref 10–45)
ANION GAP SERPL CALC-SCNC: 12 MMOL/L — SIGNIFICANT CHANGE UP (ref 5–17)
ANION GAP SERPL CALC-SCNC: 12 MMOL/L — SIGNIFICANT CHANGE UP (ref 5–17)
AST SERPL-CCNC: 64 U/L — HIGH (ref 10–40)
AST SERPL-CCNC: 64 U/L — HIGH (ref 10–40)
BASOPHILS # BLD AUTO: 0.04 K/UL — SIGNIFICANT CHANGE UP (ref 0–0.2)
BASOPHILS # BLD AUTO: 0.04 K/UL — SIGNIFICANT CHANGE UP (ref 0–0.2)
BASOPHILS NFR BLD AUTO: 0.8 % — SIGNIFICANT CHANGE UP (ref 0–2)
BASOPHILS NFR BLD AUTO: 0.8 % — SIGNIFICANT CHANGE UP (ref 0–2)
BILIRUB SERPL-MCNC: 0.4 MG/DL — SIGNIFICANT CHANGE UP (ref 0.2–1.2)
BILIRUB SERPL-MCNC: 0.4 MG/DL — SIGNIFICANT CHANGE UP (ref 0.2–1.2)
BLD GP AB SCN SERPL QL: NEGATIVE — SIGNIFICANT CHANGE UP
BLD GP AB SCN SERPL QL: NEGATIVE — SIGNIFICANT CHANGE UP
BUN SERPL-MCNC: 10 MG/DL — SIGNIFICANT CHANGE UP (ref 7–23)
BUN SERPL-MCNC: 10 MG/DL — SIGNIFICANT CHANGE UP (ref 7–23)
CALCIUM SERPL-MCNC: 8.7 MG/DL — SIGNIFICANT CHANGE UP (ref 8.4–10.5)
CALCIUM SERPL-MCNC: 8.7 MG/DL — SIGNIFICANT CHANGE UP (ref 8.4–10.5)
CHLORIDE SERPL-SCNC: 104 MMOL/L — SIGNIFICANT CHANGE UP (ref 96–108)
CHLORIDE SERPL-SCNC: 104 MMOL/L — SIGNIFICANT CHANGE UP (ref 96–108)
CO2 SERPL-SCNC: 21 MMOL/L — LOW (ref 22–31)
CO2 SERPL-SCNC: 21 MMOL/L — LOW (ref 22–31)
CREAT SERPL-MCNC: 0.98 MG/DL — SIGNIFICANT CHANGE UP (ref 0.5–1.3)
CREAT SERPL-MCNC: 0.98 MG/DL — SIGNIFICANT CHANGE UP (ref 0.5–1.3)
EGFR: 54 ML/MIN/1.73M2 — LOW
EGFR: 54 ML/MIN/1.73M2 — LOW
EOSINOPHIL # BLD AUTO: 0.28 K/UL — SIGNIFICANT CHANGE UP (ref 0–0.5)
EOSINOPHIL # BLD AUTO: 0.28 K/UL — SIGNIFICANT CHANGE UP (ref 0–0.5)
EOSINOPHIL NFR BLD AUTO: 5.5 % — SIGNIFICANT CHANGE UP (ref 0–6)
EOSINOPHIL NFR BLD AUTO: 5.5 % — SIGNIFICANT CHANGE UP (ref 0–6)
GLUCOSE SERPL-MCNC: 90 MG/DL — SIGNIFICANT CHANGE UP (ref 70–99)
GLUCOSE SERPL-MCNC: 90 MG/DL — SIGNIFICANT CHANGE UP (ref 70–99)
HCT VFR BLD CALC: 34.4 % — LOW (ref 34.5–45)
HCT VFR BLD CALC: 34.4 % — LOW (ref 34.5–45)
HGB BLD-MCNC: 11.1 G/DL — LOW (ref 11.5–15.5)
HGB BLD-MCNC: 11.1 G/DL — LOW (ref 11.5–15.5)
IMM GRANULOCYTES NFR BLD AUTO: 0.2 % — SIGNIFICANT CHANGE UP (ref 0–0.9)
IMM GRANULOCYTES NFR BLD AUTO: 0.2 % — SIGNIFICANT CHANGE UP (ref 0–0.9)
LYMPHOCYTES # BLD AUTO: 1.09 K/UL — SIGNIFICANT CHANGE UP (ref 1–3.3)
LYMPHOCYTES # BLD AUTO: 1.09 K/UL — SIGNIFICANT CHANGE UP (ref 1–3.3)
LYMPHOCYTES # BLD AUTO: 21.5 % — SIGNIFICANT CHANGE UP (ref 13–44)
LYMPHOCYTES # BLD AUTO: 21.5 % — SIGNIFICANT CHANGE UP (ref 13–44)
MAGNESIUM SERPL-MCNC: 1.8 MG/DL — SIGNIFICANT CHANGE UP (ref 1.6–2.6)
MAGNESIUM SERPL-MCNC: 1.8 MG/DL — SIGNIFICANT CHANGE UP (ref 1.6–2.6)
MCHC RBC-ENTMCNC: 31 PG — SIGNIFICANT CHANGE UP (ref 27–34)
MCHC RBC-ENTMCNC: 31 PG — SIGNIFICANT CHANGE UP (ref 27–34)
MCHC RBC-ENTMCNC: 32.3 GM/DL — SIGNIFICANT CHANGE UP (ref 32–36)
MCHC RBC-ENTMCNC: 32.3 GM/DL — SIGNIFICANT CHANGE UP (ref 32–36)
MCV RBC AUTO: 96.1 FL — SIGNIFICANT CHANGE UP (ref 80–100)
MCV RBC AUTO: 96.1 FL — SIGNIFICANT CHANGE UP (ref 80–100)
MONOCYTES # BLD AUTO: 0.53 K/UL — SIGNIFICANT CHANGE UP (ref 0–0.9)
MONOCYTES # BLD AUTO: 0.53 K/UL — SIGNIFICANT CHANGE UP (ref 0–0.9)
MONOCYTES NFR BLD AUTO: 10.5 % — SIGNIFICANT CHANGE UP (ref 2–14)
MONOCYTES NFR BLD AUTO: 10.5 % — SIGNIFICANT CHANGE UP (ref 2–14)
NEUTROPHILS # BLD AUTO: 3.11 K/UL — SIGNIFICANT CHANGE UP (ref 1.8–7.4)
NEUTROPHILS # BLD AUTO: 3.11 K/UL — SIGNIFICANT CHANGE UP (ref 1.8–7.4)
NEUTROPHILS NFR BLD AUTO: 61.5 % — SIGNIFICANT CHANGE UP (ref 43–77)
NEUTROPHILS NFR BLD AUTO: 61.5 % — SIGNIFICANT CHANGE UP (ref 43–77)
NRBC # BLD: 0 /100 WBCS — SIGNIFICANT CHANGE UP (ref 0–0)
NRBC # BLD: 0 /100 WBCS — SIGNIFICANT CHANGE UP (ref 0–0)
PHOSPHATE SERPL-MCNC: 3.7 MG/DL — SIGNIFICANT CHANGE UP (ref 2.5–4.5)
PHOSPHATE SERPL-MCNC: 3.7 MG/DL — SIGNIFICANT CHANGE UP (ref 2.5–4.5)
PLATELET # BLD AUTO: 109 K/UL — LOW (ref 150–400)
PLATELET # BLD AUTO: 109 K/UL — LOW (ref 150–400)
POTASSIUM SERPL-MCNC: 4.1 MMOL/L — SIGNIFICANT CHANGE UP (ref 3.5–5.3)
POTASSIUM SERPL-MCNC: 4.1 MMOL/L — SIGNIFICANT CHANGE UP (ref 3.5–5.3)
POTASSIUM SERPL-SCNC: 4.1 MMOL/L — SIGNIFICANT CHANGE UP (ref 3.5–5.3)
POTASSIUM SERPL-SCNC: 4.1 MMOL/L — SIGNIFICANT CHANGE UP (ref 3.5–5.3)
PROT SERPL-MCNC: 5.9 G/DL — LOW (ref 6–8.3)
PROT SERPL-MCNC: 5.9 G/DL — LOW (ref 6–8.3)
RBC # BLD: 3.58 M/UL — LOW (ref 3.8–5.2)
RBC # BLD: 3.58 M/UL — LOW (ref 3.8–5.2)
RBC # FLD: 13.3 % — SIGNIFICANT CHANGE UP (ref 10.3–14.5)
RBC # FLD: 13.3 % — SIGNIFICANT CHANGE UP (ref 10.3–14.5)
RH IG SCN BLD-IMP: POSITIVE — SIGNIFICANT CHANGE UP
RH IG SCN BLD-IMP: POSITIVE — SIGNIFICANT CHANGE UP
SODIUM SERPL-SCNC: 137 MMOL/L — SIGNIFICANT CHANGE UP (ref 135–145)
SODIUM SERPL-SCNC: 137 MMOL/L — SIGNIFICANT CHANGE UP (ref 135–145)
WBC # BLD: 5.06 K/UL — SIGNIFICANT CHANGE UP (ref 3.8–10.5)
WBC # BLD: 5.06 K/UL — SIGNIFICANT CHANGE UP (ref 3.8–10.5)
WBC # FLD AUTO: 5.06 K/UL — SIGNIFICANT CHANGE UP (ref 3.8–10.5)
WBC # FLD AUTO: 5.06 K/UL — SIGNIFICANT CHANGE UP (ref 3.8–10.5)

## 2023-11-09 PROCEDURE — 88305 TISSUE EXAM BY PATHOLOGIST: CPT | Mod: 26

## 2023-11-09 PROCEDURE — 45380 COLONOSCOPY AND BIOPSY: CPT

## 2023-11-09 PROCEDURE — 71260 CT THORAX DX C+: CPT | Mod: 26

## 2023-11-09 PROCEDURE — 99239 HOSP IP/OBS DSCHRG MGMT >30: CPT

## 2023-11-09 RX ORDER — NYSTATIN CREAM 100000 [USP'U]/G
1 CREAM TOPICAL
Qty: 1 | Refills: 0
Start: 2023-11-09

## 2023-11-09 RX ORDER — MIRTAZAPINE 45 MG/1
1 TABLET, ORALLY DISINTEGRATING ORAL
Qty: 30 | Refills: 1
Start: 2023-11-09 | End: 2024-01-07

## 2023-11-09 RX ADMIN — PANTOPRAZOLE SODIUM 40 MILLIGRAM(S): 20 TABLET, DELAYED RELEASE ORAL at 06:40

## 2023-11-09 RX ADMIN — OXYCODONE HYDROCHLORIDE 10 MILLIGRAM(S): 5 TABLET ORAL at 12:00

## 2023-11-09 RX ADMIN — OXYCODONE HYDROCHLORIDE 10 MILLIGRAM(S): 5 TABLET ORAL at 12:30

## 2023-11-09 RX ADMIN — PANTOPRAZOLE SODIUM 40 MILLIGRAM(S): 20 TABLET, DELAYED RELEASE ORAL at 17:08

## 2023-11-09 RX ADMIN — Medication 10 MILLIGRAM(S): at 17:13

## 2023-11-09 RX ADMIN — LOSARTAN POTASSIUM 50 MILLIGRAM(S): 100 TABLET, FILM COATED ORAL at 06:40

## 2023-11-09 RX ADMIN — Medication 50 MILLIGRAM(S): at 12:00

## 2023-11-09 RX ADMIN — OXYCODONE HYDROCHLORIDE 5 MILLIGRAM(S): 5 TABLET ORAL at 17:08

## 2023-11-09 RX ADMIN — POLYETHYLENE GLYCOL 3350 17 GRAM(S): 17 POWDER, FOR SOLUTION ORAL at 06:40

## 2023-11-09 RX ADMIN — SENNA PLUS 1 TABLET(S): 8.6 TABLET ORAL at 06:40

## 2023-11-09 RX ADMIN — OXYCODONE HYDROCHLORIDE 10 MILLIGRAM(S): 5 TABLET ORAL at 04:52

## 2023-11-09 RX ADMIN — Medication 10 MILLIGRAM(S): at 06:45

## 2023-11-09 RX ADMIN — OXYCODONE HYDROCHLORIDE 5 MILLIGRAM(S): 5 TABLET ORAL at 18:08

## 2023-11-09 NOTE — PROGRESS NOTE ADULT - PROBLEM SELECTOR PLAN 3
20 year hx. Complains of localized constant occipital headaches for the last year. Neuro exam WNL. CN II-XII grossly intact except for the presence of asymmetric facial sensation diminished on the R    - CT-head to evaluate for increasing size  - Pain control as above

## 2023-11-09 NOTE — PROGRESS NOTE ADULT - SUBJECTIVE AND OBJECTIVE BOX
INTERVAL HPI/OVERNIGHT EVENTS:  Patient was seen and examined at bedside. As per nurse and patient, no o/n events, patient resting comfortably. Pt complaining of abdominal pain and feeling very cold.       VITAL SIGNS:  T(F): 97.4 (11-07-23 @ 06:13)  HR: 57 (11-07-23 @ 09:38)  BP: 163/67 (11-07-23 @ 09:38)  RR: 17 (11-07-23 @ 06:13)  SpO2: 97% (11-07-23 @ 06:13)  Wt(kg): --        PHYSICAL EXAM:    CONSTITUTIONAL:  Positive for weakness. No fevers or chills  EYES/ENT:  No visual changes;  No vertigo or throat pain   NECK:  No pain or stiffness  RESPIRATORY:  No cough, wheezing, hemoptysis; No shortness of breath  CARDIOVASCULAR:  No chest pain or palpitations  GASTROINTESTINAL:  Positive for abdominal pain and nausea. She also endorses spontaneous intermittent vomiting, w/o hematemesis; No diarrhea or constipation. No melena or hematochezia.  GENITOURINARY:  Positive dysuria and frequency w/o hematuria  MUSCULOSKELETAL:  Decreased range of motion in all extremities, decreased strength, No calf tenderness  NEUROLOGICAL:  Diffuse weakness. Decreased sensation on the R side of the face  SKIN:  No itching, rashes    MEDICATIONS  (STANDING):  atorvastatin 40 milliGRAM(s) Oral at bedtime  hydrALAZINE 10 milliGRAM(s) Oral every 8 hours  influenza  Vaccine (HIGH DOSE) 0.7 milliLiter(s) IntraMuscular once  LORazepam     Tablet 2 milliGRAM(s) Oral every 12 hours  losartan 50 milliGRAM(s) Oral every 24 hours  metoclopramide 5 milliGRAM(s) Oral every 12 hours  metoprolol succinate ER 50 milliGRAM(s) Oral every 24 hours  pantoprazole    Tablet 40 milliGRAM(s) Oral before breakfast  polyethylene glycol 3350 17 Gram(s) Oral every 24 hours  senna 1 Tablet(s) Oral every 24 hours    MEDICATIONS  (PRN):  aluminum hydroxide/magnesium hydroxide/simethicone Suspension 30 milliLiter(s) Oral every 4 hours PRN Dyspepsia  melatonin 3 milliGRAM(s) Oral at bedtime PRN Insomnia  ondansetron Injectable 4 milliGRAM(s) IV Push every 8 hours PRN Nausea and/or Vomiting  oxycodone    5 mG/acetaminophen 325 mG 1 Tablet(s) Oral every 6 hours PRN Mild Pain (1 - 3)      Allergies    No Known Allergies    Intolerances        LABS:                        10.7   3.27  )-----------( 109      ( 07 Nov 2023 07:46 )             34.1     11-07    138  |  105  |  17  ----------------------------<  108<H>  4.0   |  24  |  0.90    Ca    8.7      07 Nov 2023 07:46  Phos  3.6     11-07  Mg     2.0     11-07    TPro  5.7<L>  /  Alb  3.2<L>  /  TBili  0.2  /  DBili  x   /  AST  15  /  ALT  8<L>  /  AlkPhos  48  11-07    PT/INR - ( 06 Nov 2023 19:12 )   PT: 10.2 sec;   INR: 0.89          PTT - ( 06 Nov 2023 19:12 )  PTT:29.1 sec  Urinalysis Basic - ( 07 Nov 2023 07:46 )    Color: x / Appearance: x / SG: x / pH: x  Gluc: 108 mg/dL / Ketone: x  / Bili: x / Urobili: x   Blood: x / Protein: x / Nitrite: x   Leuk Esterase: x / RBC: x / WBC x   Sq Epi: x / Non Sq Epi: x / Bacteria: x        RADIOLOGY & ADDITIONAL TESTS:  Reviewed
INTERVAL HPI/OVERNIGHT EVENTS:  Patient was seen and examined at bedside. As per nurse and patient, no o/n events, patient resting comfortably.     VITAL SIGNS:  T(F): 97.4 (11-09-23 @ 12:00)  HR: 67 (11-09-23 @ 13:27)  BP: 154/67 (11-09-23 @ 13:27)  RR: 17 (11-09-23 @ 13:27)  SpO2: 95% (11-09-23 @ 13:27)  Wt(kg): --        PHYSICAL EXAM:    Constitutional: resting comfortably in bed; NAD  HEENT: NC/AT, PER, anicteric sclera, no nasal discharge; MMM  Neck: supple; no JVD or thyromegaly  Respiratory: CTA B/L; no W/R/R, no retractions  Cardiac: +S1/S2; RRR; no M/R/G  Gastrointestinal: soft, NT/ND; no rebound or guarding  Back: spine midline, no bony tenderness or step-offs; no CVAT B/L  Extremities: WWP, no clubbing or cyanosis; no peripheral edema  Musculoskeletal: NROM x4; no joint swelling, tenderness or erythema  Vascular: 2+ radial, DP/PT pulses B/L  Dermatologic: skin warm, dry and intact; no rashes, wounds, or scars  Neurologic: AAOx3; CNII-XII grossly intact; no focal deficits  Psychiatric: affect and characteristics of appearance, verbalizations, behaviors are appropriate    MEDICATIONS  (STANDING):  atorvastatin 40 milliGRAM(s) Oral at bedtime  hydrALAZINE 10 milliGRAM(s) Oral every 8 hours  influenza  Vaccine (HIGH DOSE) 0.7 milliLiter(s) IntraMuscular once  LORazepam     Tablet 2 milliGRAM(s) Oral every 12 hours  losartan 50 milliGRAM(s) Oral every 24 hours  metoclopramide 5 milliGRAM(s) Oral every 12 hours  metoprolol succinate ER 50 milliGRAM(s) Oral every 24 hours  mirtazapine 7.5 milliGRAM(s) Oral at bedtime  nystatin Powder 1 Application(s) Topical once  pantoprazole    Tablet 40 milliGRAM(s) Oral two times a day  polyethylene glycol 3350 17 Gram(s) Oral every 24 hours  senna 1 Tablet(s) Oral every 24 hours    MEDICATIONS  (PRN):  aluminum hydroxide/magnesium hydroxide/simethicone Suspension 30 milliLiter(s) Oral every 4 hours PRN Dyspepsia  melatonin 3 milliGRAM(s) Oral at bedtime PRN Insomnia  ondansetron Injectable 4 milliGRAM(s) IV Push every 8 hours PRN Nausea and/or Vomiting  oxyCODONE    IR 5 milliGRAM(s) Oral every 6 hours PRN Moderate Pain (4 - 6)  oxyCODONE    IR 10 milliGRAM(s) Oral every 8 hours PRN Severe Pain (7 - 10)      Allergies    No Known Allergies    Intolerances        LABS:                        11.1   5.06  )-----------( 109      ( 09 Nov 2023 05:30 )             34.4     11-09    137  |  104  |  10  ----------------------------<  90  4.1   |  21<L>  |  0.98    Ca    8.7      09 Nov 2023 05:30  Phos  3.7     11-09  Mg     1.8     11-09    TPro  5.9<L>  /  Alb  3.2<L>  /  TBili  0.4  /  DBili  x   /  AST  64<H>  /  ALT  38  /  AlkPhos  67  11-09    PT/INR - ( 08 Nov 2023 05:30 )   PT: 10.3 sec;   INR: 0.90            Urinalysis Basic - ( 09 Nov 2023 05:30 )    Color: x / Appearance: x / SG: x / pH: x  Gluc: 90 mg/dL / Ketone: x  / Bili: x / Urobili: x   Blood: x / Protein: x / Nitrite: x   Leuk Esterase: x / RBC: x / WBC x   Sq Epi: x / Non Sq Epi: x / Bacteria: x        RADIOLOGY & ADDITIONAL TESTS:  Reviewed
INTERVAL HPI/OVERNIGHT EVENTS:  Patient was seen and examined at bedside. As per nurse and patient, no o/n events, patient resting comfortably. No complaints at this time.     VITAL SIGNS:  T(F): 97.7 (11-08-23 @ 12:16)  HR: 63 (11-08-23 @ 12:16)  BP: 137/66 (11-08-23 @ 12:16)  RR: 18 (11-08-23 @ 12:16)  SpO2: 94% (11-08-23 @ 12:16)  Wt(kg): --        PHYSICAL EXAM:  CONSTITUTIONAL:  Positive for weakness. No fevers or chills  EYES/ENT:  No visual changes;  No vertigo or throat pain   NECK:  No pain or stiffness  RESPIRATORY:  No cough, wheezing, hemoptysis; No shortness of breath  CARDIOVASCULAR:  No chest pain or palpitations  GASTROINTESTINAL:  Positive for abdominal pain and nausea. She also endorses spontaneous intermittent vomiting, w/o hematemesis; No diarrhea or constipation. No melena or hematochezia.  GENITOURINARY:  Positive dysuria and frequency w/o hematuria  MUSCULOSKELETAL:  Decreased range of motion in all extremities, decreased strength, No calf tenderness  NEUROLOGICAL:  Diffuse weakness. Decreased sensation on the R side of the face  SKIN:  No itching, rashes      MEDICATIONS  (STANDING):  atorvastatin 40 milliGRAM(s) Oral at bedtime  hydrALAZINE 10 milliGRAM(s) Oral every 8 hours  influenza  Vaccine (HIGH DOSE) 0.7 milliLiter(s) IntraMuscular once  LORazepam     Tablet 2 milliGRAM(s) Oral every 12 hours  losartan 50 milliGRAM(s) Oral every 24 hours  metoclopramide 5 milliGRAM(s) Oral every 12 hours  metoprolol succinate ER 50 milliGRAM(s) Oral every 24 hours  pantoprazole    Tablet 40 milliGRAM(s) Oral before breakfast  polyethylene glycol 3350 17 Gram(s) Oral every 24 hours  polyethylene glycol/electrolyte Solution. 4000 milliLiter(s) Oral once  senna 1 Tablet(s) Oral every 24 hours    MEDICATIONS  (PRN):  aluminum hydroxide/magnesium hydroxide/simethicone Suspension 30 milliLiter(s) Oral every 4 hours PRN Dyspepsia  melatonin 3 milliGRAM(s) Oral at bedtime PRN Insomnia  ondansetron Injectable 4 milliGRAM(s) IV Push every 8 hours PRN Nausea and/or Vomiting  oxycodone    5 mG/acetaminophen 325 mG 1 Tablet(s) Oral every 6 hours PRN Mild Pain (1 - 3)      Allergies    No Known Allergies    Intolerances        LABS:                        11.0   4.17  )-----------( 111      ( 08 Nov 2023 05:30 )             33.5     11-07    136  |  104  |  17  ----------------------------<  127<H>  3.8   |  20<L>  |  0.84    Ca    8.7      07 Nov 2023 18:36  Phos  3.6     11-07  Mg     2.0     11-07    TPro  5.6<L>  /  Alb  3.1<L>  /  TBili  <0.2  /  DBili  x   /  AST  17  /  ALT  9<L>  /  AlkPhos  49  11-07    PT/INR - ( 08 Nov 2023 05:30 )   PT: 10.3 sec;   INR: 0.90          PTT - ( 06 Nov 2023 19:12 )  PTT:29.1 sec  Urinalysis Basic - ( 07 Nov 2023 18:36 )    Color: x / Appearance: x / SG: x / pH: x  Gluc: 127 mg/dL / Ketone: x  / Bili: x / Urobili: x   Blood: x / Protein: x / Nitrite: x   Leuk Esterase: x / RBC: x / WBC x   Sq Epi: x / Non Sq Epi: x / Bacteria: x        RADIOLOGY & ADDITIONAL TESTS:  Reviewed

## 2023-11-09 NOTE — PROGRESS NOTE ADULT - PROBLEM SELECTOR PROBLEM 6
Chronic prescription benzodiazepine use

## 2023-11-09 NOTE — DISCHARGE NOTE NURSING/CASE MANAGEMENT/SOCIAL WORK - PATIENT PORTAL LINK FT
You can access the FollowMyHealth Patient Portal offered by [facility] by registering at the following website: [email or web address]. By joining [facility] FollowMyHealth portal, you will also be able to view your health information using other applications (apps) compatible with our system.

## 2023-11-09 NOTE — DISCHARGE NOTE NURSING/CASE MANAGEMENT/SOCIAL WORK - NSDCPEFALRISK_GEN_ALL_CORE
For information on Fall & Injury Prevention, visit: [email or web address] OR  [email or web address] OR  https://www.cdc.gov/steadi/patient.html

## 2023-11-09 NOTE — PRE-ANESTHESIA EVALUATION ADULT - NSANTHOSAYNRD_GEN_A_CORE
No. SUNNY screening performed.  STOP BANG Legend: 0-2 = LOW Risk; 3-4 = INTERMEDIATE Risk; 5-8 = HIGH Risk
No. SUNNY screening performed.  STOP BANG Legend: 0-2 = LOW Risk; 3-4 = INTERMEDIATE Risk; 5-8 = HIGH Risk

## 2023-11-09 NOTE — CHART NOTE - NSCHARTNOTEFT_GEN_A_CORE
Colonoscopy performed in endo suite for abdominal pain and abnormal CT.    Impressions:  	Erosions in the terminal ileum. (Biopsy).  	Mild erythema in the cecum, ascending colon and proximal transverse colon compatible with indeterminate colitis. (Biopsy).  	Polyp (3 mm) in the rectum. (Polypectomy).  	Polyp (2 mm) in the sigmoid colon.  	Mild diverticulosis of the sigmoid colon.  	Grade/Stage I internal hemorrhoids.    Plan:	  Advance diet as tolerated  Await pathology results.  Return to floor for further management    Pérez (Ugodaniel Pacheco MD  Gastroenterology Fellow  Pager (M-F 7a-5p): [phone]  Pager (after hours): Please call  for on-call fellow

## 2023-11-09 NOTE — PROGRESS NOTE ADULT - PROBLEM SELECTOR PLAN 2
Major back surgery in 2018. Pt has had consistent pain since then managed by PCP.  Xray pelvix and Xray spine: No acute displaced pelvic fracture. No acute osseous abnormality. Mild to moderate long thoracolumbar dextroscoliosis.    - ISTOP  - C/w home Percocet q6  - Senna and Miralax for Bowel regimen
Major back surgery in 2018. Pt has had consistent pain since then managed by PCP.    - ISTOP  - C/w home Percocet q6  - Senna and Miralax for Bowel regimen
Major back surgery in 2018. Pt has had consistent pain since then managed by PCP.  Xray pelvix and Xray spine: No acute displaced pelvic fracture. No acute osseous abnormality. Mild to moderate long thoracolumbar dextroscoliosis.    - ISTOP  - C/w home Percocet q6  - Senna and Miralax for Bowel regimen

## 2023-11-09 NOTE — DISCHARGE NOTE NURSING/CASE MANAGEMENT/SOCIAL WORK - NSDCFUADDAPPT_GEN_ALL_CORE_FT
Dr. Neely will call you to make a follow up appointment within 1 week.     Please make a follow up appoint with the gastroenterologist Maximo Franz within 1 week fo discharge. Please call Phone: [phone] to make an appointment. 
(0) independent

## 2023-11-09 NOTE — PROGRESS NOTE ADULT - PROBLEM SELECTOR PLAN 7
F: PO  E: replete PRN  N: dash  DVT ppx: lovenox if CTH negative, SCDs  GI PPx: PPI

## 2023-11-09 NOTE — PRE-ANESTHESIA EVALUATION ADULT - NSANTHPMHFT_GEN_ALL_CORE
Reviewed. HTN, HLD, meningioma, dysphagia, Crohn's disease, chronic back pain
HTN, HLD, meningioma, dysphagia, Crohn's disease, chronic back pain.

## 2023-11-09 NOTE — PROGRESS NOTE ADULT - PROBLEM SELECTOR PLAN 6
Endorses taking 2mg Ativan daily    -C/w 2mg Ativan  - ISTOP

## 2023-11-09 NOTE — PROGRESS NOTE ADULT - PROBLEM SELECTOR PLAN 4
- c/w losartan 50mg, toprol 50mg, hydral 10mg TID

## 2023-11-11 LAB
SURGICAL PATHOLOGY STUDY: SIGNIFICANT CHANGE UP
SURGICAL PATHOLOGY STUDY: SIGNIFICANT CHANGE UP

## 2023-11-13 ENCOUNTER — APPOINTMENT (OUTPATIENT)
Dept: NEPHROLOGY | Facility: CLINIC | Age: 88
End: 2023-11-13
Payer: MEDICARE

## 2023-11-13 VITALS — SYSTOLIC BLOOD PRESSURE: 160 MMHG | DIASTOLIC BLOOD PRESSURE: 70 MMHG

## 2023-11-13 VITALS — SYSTOLIC BLOOD PRESSURE: 148 MMHG | DIASTOLIC BLOOD PRESSURE: 60 MMHG

## 2023-11-13 PROCEDURE — 99496 TRANSJ CARE MGMT HIGH F2F 7D: CPT

## 2023-11-14 LAB
SURGICAL PATHOLOGY STUDY: SIGNIFICANT CHANGE UP
SURGICAL PATHOLOGY STUDY: SIGNIFICANT CHANGE UP

## 2023-12-13 PROCEDURE — 86901 BLOOD TYPING SEROLOGIC RH(D): CPT

## 2023-12-13 PROCEDURE — 86140 C-REACTIVE PROTEIN: CPT

## 2023-12-13 PROCEDURE — 84100 ASSAY OF PHOSPHORUS: CPT

## 2023-12-13 PROCEDURE — 72080 X-RAY EXAM THORACOLMB 2/> VW: CPT

## 2023-12-13 PROCEDURE — 74177 CT ABD & PELVIS W/CONTRAST: CPT

## 2023-12-13 PROCEDURE — 86850 RBC ANTIBODY SCREEN: CPT

## 2023-12-13 PROCEDURE — 85025 COMPLETE CBC W/AUTO DIFF WBC: CPT

## 2023-12-13 PROCEDURE — 80053 COMPREHEN METABOLIC PANEL: CPT

## 2023-12-13 PROCEDURE — 70450 CT HEAD/BRAIN W/O DYE: CPT

## 2023-12-13 PROCEDURE — 36415 COLL VENOUS BLD VENIPUNCTURE: CPT

## 2023-12-13 PROCEDURE — 83735 ASSAY OF MAGNESIUM: CPT

## 2023-12-13 PROCEDURE — 88341 IMHCHEM/IMCYTCHM EA ADD ANTB: CPT

## 2023-12-13 PROCEDURE — 71260 CT THORAX DX C+: CPT

## 2023-12-13 PROCEDURE — 86900 BLOOD TYPING SEROLOGIC ABO: CPT

## 2023-12-13 PROCEDURE — 85730 THROMBOPLASTIN TIME PARTIAL: CPT

## 2023-12-13 PROCEDURE — 88305 TISSUE EXAM BY PATHOLOGIST: CPT

## 2023-12-13 PROCEDURE — 85652 RBC SED RATE AUTOMATED: CPT

## 2023-12-13 PROCEDURE — 72170 X-RAY EXAM OF PELVIS: CPT

## 2023-12-13 PROCEDURE — 85027 COMPLETE CBC AUTOMATED: CPT

## 2023-12-13 PROCEDURE — 85610 PROTHROMBIN TIME: CPT

## 2023-12-27 ENCOUNTER — APPOINTMENT (OUTPATIENT)
Dept: GASTROENTEROLOGY | Facility: CLINIC | Age: 88
End: 2023-12-27

## 2023-12-28 ENCOUNTER — LABORATORY RESULT (OUTPATIENT)
Age: 88
End: 2023-12-28

## 2023-12-28 ENCOUNTER — APPOINTMENT (OUTPATIENT)
Dept: NEPHROLOGY | Facility: CLINIC | Age: 88
End: 2023-12-28
Payer: MEDICARE

## 2023-12-28 VITALS — BODY MASS INDEX: 21.35 KG/M2 | WEIGHT: 113 LBS

## 2023-12-28 DIAGNOSIS — I10 ESSENTIAL (PRIMARY) HYPERTENSION: ICD-10-CM

## 2023-12-28 DIAGNOSIS — Z00.00 ENCOUNTER FOR GENERAL ADULT MEDICAL EXAMINATION W/OUT ABNORMAL FINDINGS: ICD-10-CM

## 2023-12-28 DIAGNOSIS — K50.90 CROHN'S DISEASE, UNSPECIFIED, W/OUT COMPLICATIONS: ICD-10-CM

## 2023-12-28 PROCEDURE — 99214 OFFICE O/P EST MOD 30 MIN: CPT | Mod: 25

## 2023-12-28 PROCEDURE — 36415 COLL VENOUS BLD VENIPUNCTURE: CPT

## 2023-12-29 LAB
25(OH)D3 SERPL-MCNC: 23.6 NG/ML
ALBUMIN SERPL ELPH-MCNC: 4 G/DL
ALP BLD-CCNC: 49 U/L
ALT SERPL-CCNC: 12 U/L
ANION GAP SERPL CALC-SCNC: 13 MMOL/L
APPEARANCE: CLEAR
AST SERPL-CCNC: 19 U/L
BACTERIA: ABNORMAL /HPF
BILIRUB SERPL-MCNC: 0.2 MG/DL
BILIRUBIN URINE: NEGATIVE
BLOOD URINE: NEGATIVE
BUN SERPL-MCNC: 21 MG/DL
CALCIUM SERPL-MCNC: 9.2 MG/DL
CALCIUM SERPL-MCNC: 9.2 MG/DL
CAST: 3 /LPF
CHLORIDE SERPL-SCNC: 105 MMOL/L
CHOLEST SERPL-MCNC: 149 MG/DL
CO2 SERPL-SCNC: 22 MMOL/L
COLOR: YELLOW
CREAT SERPL-MCNC: 1.02 MG/DL
CREAT SPEC-SCNC: 46 MG/DL
CREAT/PROT UR: 0.4 RATIO
CRP SERPL-MCNC: <3 MG/L
CYSTATIN C SERPL-MCNC: 1.86 MG/L
EGFR: 51 ML/MIN/1.73M2
EPITHELIAL CELLS: 1 /HPF
ERYTHROCYTE [SEDIMENTATION RATE] IN BLOOD BY WESTERGREN METHOD: 8 MM/HR
ESTIMATED AVERAGE GLUCOSE: 103 MG/DL
FERRITIN SERPL-MCNC: 25 NG/ML
FOLATE SERPL-MCNC: 9.8 NG/ML
GFR/BSA.PRED SERPLBLD CYS-BASED-ARV: 28 ML/MIN/1.73M2
GLUCOSE QUALITATIVE U: NEGATIVE MG/DL
GLUCOSE SERPL-MCNC: 77 MG/DL
HBA1C MFR BLD HPLC: 5.2 %
HCT VFR BLD CALC: 34.4 %
HCYS SERPL-MCNC: 24.2 UMOL/L
HDLC SERPL-MCNC: 48 MG/DL
HGB BLD-MCNC: 11.3 G/DL
IRON SATN MFR SERPL: 15 %
IRON SERPL-MCNC: 49 UG/DL
KETONES URINE: NEGATIVE MG/DL
LDLC SERPL CALC-MCNC: 80 MG/DL
LEUKOCYTE ESTERASE URINE: ABNORMAL
MAGNESIUM SERPL-MCNC: 1.9 MG/DL
MCHC RBC-ENTMCNC: 31.4 PG
MCHC RBC-ENTMCNC: 32.8 GM/DL
MCV RBC AUTO: 95.6 FL
MICROSCOPIC-UA: NORMAL
NITRITE URINE: NEGATIVE
NONHDLC SERPL-MCNC: 101 MG/DL
PARATHYROID HORMONE INTACT: 67 PG/ML
PH URINE: 5.5
PHOSPHATE SERPL-MCNC: 4.2 MG/DL
PLATELET # BLD AUTO: 127 K/UL
POTASSIUM SERPL-SCNC: 4.5 MMOL/L
PROT SERPL-MCNC: 6.5 G/DL
PROT UR-MCNC: 20 MG/DL
PROTEIN URINE: NORMAL MG/DL
RBC # BLD: 3.6 M/UL
RBC # FLD: 13.2 %
RED BLOOD CELLS URINE: 1 /HPF
SODIUM SERPL-SCNC: 140 MMOL/L
SPECIFIC GRAVITY URINE: 1.01
T3FREE SERPL-MCNC: 2.89 PG/ML
T3RU NFR SERPL: 1 TBI
T4 FREE SERPL-MCNC: 0.9 NG/DL
T4 SERPL-MCNC: 5.4 UG/DL
THYROGLOB AB SERPL-ACNC: 7389 IU/ML
THYROPEROXIDASE AB SERPL IA-ACNC: 2200 IU/ML
TIBC SERPL-MCNC: 337 UG/DL
TRIGL SERPL-MCNC: 121 MG/DL
TSH SERPL-ACNC: 3.23 UIU/ML
UIBC SERPL-MCNC: 288 UG/DL
URATE SERPL-MCNC: 5 MG/DL
UROBILINOGEN URINE: 0.2 MG/DL
VIT B12 SERPL-MCNC: 342 PG/ML
WBC # FLD AUTO: 4.36 K/UL
WHITE BLOOD CELLS URINE: 140 /HPF

## 2023-12-29 NOTE — ADDENDUM
[FreeTextEntry1] :   BP/HR taken in different positions (sitting standing and lying down) and d/w pt Self-monitoring at home advised i.e. BP, FS, etc. Medications updated Recent lab results reviewed Diet/healthy lifestyle counselling New labs ordered Gi follow up upcoming

## 2023-12-29 NOTE — HISTORY OF PRESENT ILLNESS
[___ Month(s) Ago] : [unfilled] month(s) ago [Doing Well] : doing well [FreeTextEntry1] : 89 y/o F with PMHX of Crohn's disease,meningioma, HTN, dysphagia, being seen for follow up visit  Patient c/o multiple sites pain and she has cramps Per her, she has right shoulder pain and hip pain and feels like she is deteriorating  Patient had recent hospitalization @[facility] for abdominal pain from Nov 6-9,2023 She was treated with Zofran4 mg  q6H prn, PPI @ Dr Marquis She was given Senna and Miralax for bowel regimen For back pain, she was given percocet q 6H prn She is being followed by robert mtz MD/Dr Paresh Nayak  GI: : colitis vs esophagitis CT of abdomen /pelvis done Colonoscopy: mild diverticulosis of the sigmoid colon She has GI follow up appt next week

## 2023-12-31 LAB — 24R-OH-CALCIDIOL SERPL-MCNC: 36.4 PG/ML

## 2024-01-01 LAB — METHYLMALONATE SERPL-SCNC: 265 NMOL/L

## 2024-01-03 LAB
ALBUMIN MFR SERPL ELPH: 56.5 %
ALBUMIN SERPL-MCNC: 3.7 G/DL
ALBUMIN/GLOB SERPL: 1.3 RATIO
ALP BONE SERPL-MCNC: 10.4 UG/L
ALPHA1 GLOB MFR SERPL ELPH: 4.8 %
ALPHA1 GLOB SERPL ELPH-MCNC: 0.3 G/DL
ALPHA2 GLOB MFR SERPL ELPH: 11.4 %
ALPHA2 GLOB SERPL ELPH-MCNC: 0.7 G/DL
B-GLOBULIN MFR SERPL ELPH: 12.9 %
B-GLOBULIN SERPL ELPH-MCNC: 0.8 G/DL
DEPRECATED KAPPA LC FREE/LAMBDA SER: 2.57 RATIO
GAMMA GLOB FLD ELPH-MCNC: 0.9 G/DL
GAMMA GLOB MFR SERPL ELPH: 14.4 %
IGA SER QL IEP: 231 MG/DL
IGG SER QL IEP: 878 MG/DL
IGM SER QL IEP: 85 MG/DL
INTERPRETATION SERPL IEP-IMP: NORMAL
KAPPA LC CSF-MCNC: 2.61 MG/DL
KAPPA LC SERPL-MCNC: 6.72 MG/DL
M PROTEIN MFR SERPL ELPH: NORMAL
M PROTEIN SPEC IFE-MCNC: NORMAL
MONOCLON BAND OBS SERPL: NORMAL
PROT SERPL-MCNC: 6.5 G/DL
PROT SERPL-MCNC: 6.5 G/DL

## 2024-01-04 LAB — COLLAGEN CTX SERPL-MCNC: 374 PG/ML

## 2024-01-07 NOTE — CHART NOTE - NSCHARTNOTEFT_GEN_A_CORE
Spoke with patient on the phone and informed her biopsies of her stomach and duodenum were normal. States that she has an appointment with hepatology on 01/10/24 at the [address] [place] [address] to discuss the incidental finding of varices on her EGD. Emphasized the importance of patient seeing attending hepatology appointment this week. Spoke with patient on the phone and informed her biopsies of her stomach and duodenum were normal. States that she has an appointment with hepatology on 01/10/24 at the [address] [place] [address] to discuss the incidental finding of varices on her EGD. Emphasized the importance of patient seeing attending hepatology appointment this week. Spoke with patient on the phone and informed her biopsies of her stomach and duodenum were normal. States that she has an appointment with hepatology on 01/10/24 at the [address] [place] [address] to discuss the incidental finding of varices on her EGD. Emphasized the importance of patient attending hepatology appointment this week. Spoke with patient on the phone and informed her biopsies of her stomach and duodenum were normal. States that she has an appointment with hepatology on 01/10/24 at the [address] [place] [address] to discuss the incidental finding of varices on her EGD. Emphasized the importance of patient attending hepatology appointment this week.

## 2024-01-10 ENCOUNTER — APPOINTMENT (OUTPATIENT)
Dept: HEPATOLOGY | Facility: CLINIC | Age: 89
End: 2024-01-10

## 2024-01-11 ENCOUNTER — APPOINTMENT (OUTPATIENT)
Dept: GASTROENTEROLOGY | Facility: CLINIC | Age: 89
End: 2024-01-11
Payer: MEDICARE

## 2024-01-11 ENCOUNTER — RX RENEWAL (OUTPATIENT)
Age: 89
End: 2024-01-11

## 2024-01-11 VITALS
HEIGHT: 61 IN | RESPIRATION RATE: 14 BRPM | BODY MASS INDEX: 21.14 KG/M2 | WEIGHT: 112 LBS | SYSTOLIC BLOOD PRESSURE: 149 MMHG | DIASTOLIC BLOOD PRESSURE: 66 MMHG | HEART RATE: 68 BPM | TEMPERATURE: 96.4 F | OXYGEN SATURATION: 95 %

## 2024-01-11 DIAGNOSIS — R63.4 ABNORMAL WEIGHT LOSS: ICD-10-CM

## 2024-01-11 DIAGNOSIS — R13.10 DYSPHAGIA, UNSPECIFIED: ICD-10-CM

## 2024-01-11 DIAGNOSIS — K20.90 ESOPHAGITIS, UNSPECIFIED WITHOUT BLEEDING: ICD-10-CM

## 2024-01-11 PROCEDURE — 99215 OFFICE O/P EST HI 40 MIN: CPT

## 2024-01-12 NOTE — END OF VISIT
[] : Fellow [FreeTextEntry3] : 89F with a h/o Crohn's disease (dx >30 years ago), meningioma, HTN, HLD, hypothyroid, recently admitted to [facility] for failure to thrive and underwent EGD, which showed Grade C esophagitis and ?venous blebs vs. varices and colonoscopy, which showed some evidence of mild ileal inflammation, presenting now with the complaint of severe dysphagia and odynophagia. Has not been taking PPI. Start omeprazole 40 mg once daily, along with short course carafate suspension. Most likely her dysphagia related to inflamm, but can obtain esophagram to further evaluate. RTC in 6-8 weeks to assess response.   Jose R Vieyra MD Gastroenterology  [Time Spent: ___ minutes] : I have spent [unfilled] minutes of time on the encounter.

## 2024-01-12 NOTE — ASSESSMENT
[FreeTextEntry1] : 89F with PMH of Crohn's disease (dx >30 years ago), meningioma, HTN, HLD, hypothyroid, recently admitted to [facility] for failure to thrive and underwent EGD, which showed Grade C esophagitis and ?venous blebs vs. varices and colonoscopy, which showed some evidence of inflammation, presenting now with the complaint of severe dysphagia and odynophagia.   Plan:  - obtain barium esophagram  - start omeprazole 40 mg PO daily  - start Carafate suspension 1g PO 3-4x daily  - RTC in 6-8 weeks to re-assess symptoms

## 2024-01-12 NOTE — HISTORY OF PRESENT ILLNESS
[FreeTextEntry1] : 89F with PMH of Crohn's disease (dx >30 years ago), meningioma, HTN, HLD, hypothyroid, recently admitted to [facility] for failure to thrive and underwent EGD, which showed Grade C esophagitis and ?venous blebs vs. varices and colonoscopy, which showed some evidence of inflammation, presenting now with the complaint of severe dysphagia and odynophagia. Says that her symptoms are so bad they prevent her from eating, and she has lost roughly 30 lbs. over the past year or so. Experiences pain with swallowing that radiates to her back. Was discharged from recent [facility] admission with the recommendation that she continue taking a PPI and Carafate but says that she currently is not taking either medication. Also complains of some bloating and diffuse body aches and states that all of these symptoms are causing her to have a poor quality of life. Has never had a barium esophagram. Denies any diarrhea, constipation, changes in bowel habits, fevers, chills, melena, hematemesis, or hematochezia.

## 2024-01-12 NOTE — PHYSICAL EXAM
[No Masses] : no abdominal mass palpated [Abdomen Soft] : soft [Sclera] : the sclera and conjunctiva were normal [No Respiratory Distress] : no respiratory distress [No Acc Muscle Use] : no accessory muscle use [Respiration, Rhythm And Depth] : normal respiratory rhythm and effort [Heart Rate And Rhythm] : heart rate was normal and rhythm regular [] : no rash [No Focal Deficits] : no focal deficits [Oriented To Time, Place, And Person] : oriented to person, place, and time [Ascites: ___] : no ascites [Rebound Tenderness] : no rebound tenderness [de-identified] : +thin, frail, in mild discomfort  [de-identified] : +slight bloating and RUQ tenderness

## 2024-02-27 RX ORDER — SUCRALFATE 1 G/10ML
1 SUSPENSION ORAL 4 TIMES DAILY
Qty: 1200 | Refills: 0 | Status: ACTIVE | COMMUNITY
Start: 2024-01-11 | End: 1900-01-01

## 2024-03-14 ENCOUNTER — APPOINTMENT (OUTPATIENT)
Dept: GASTROENTEROLOGY | Facility: CLINIC | Age: 89
End: 2024-03-14

## 2024-05-16 NOTE — PATIENT PROFILE ADULT. - NS PRO AD PATIENT TYPE
0040-Patient resting in bed with CNA at bedside. Stable. Agitated, restless. Will continue to monitor. Assessment complete. Sitter at bedside with patient. 0209-Medicated per orders for   WA protocol. Restless. 0655-No change from initial assessment. [address], and asked if she could come see the patient for me. 0703-Quinton at bedside to assess patient, asked if she could listen to his lungs. This writer did  WA scoring, patient seems more agitated; [facility] 21.      0718-RRT called by Medic. Respiratory therapist, Dr. Usman Zazueta, and [id number] [id number] Supervisor at bedside to evaluate patient. X-ray to be done. Shift Summary:  Patient stable at shift change report. No Health Care Proxy (HCP)/Living Will/Do Not Resuscitate (DNR)

## 2024-05-22 ENCOUNTER — RX RENEWAL (OUTPATIENT)
Age: 89
End: 2024-05-22

## 2024-05-22 RX ORDER — OMEPRAZOLE 40 MG/1
40 CAPSULE, DELAYED RELEASE ORAL
Qty: 90 | Refills: 0 | Status: ACTIVE | COMMUNITY
Start: 2024-01-11 | End: 1900-01-01

## (undated) DEVICE — FORCEP RADIAL JAW 4 W NDL 2.2MM 2.8MM 240CM ORANGE DISP